# Patient Record
Sex: MALE | Race: WHITE | NOT HISPANIC OR LATINO | Employment: OTHER | URBAN - METROPOLITAN AREA
[De-identification: names, ages, dates, MRNs, and addresses within clinical notes are randomized per-mention and may not be internally consistent; named-entity substitution may affect disease eponyms.]

---

## 2021-08-23 ENCOUNTER — TELEPHONE (OUTPATIENT)
Dept: ORTHOPEDICS | Facility: CLINIC | Age: 66
End: 2021-08-23

## 2021-08-23 ENCOUNTER — OFFICE VISIT (OUTPATIENT)
Dept: ORTHOPEDICS | Facility: CLINIC | Age: 66
End: 2021-08-23
Payer: MEDICARE

## 2021-08-23 ENCOUNTER — HOSPITAL ENCOUNTER (OUTPATIENT)
Dept: RADIOLOGY | Facility: OTHER | Age: 66
Discharge: HOME | End: 2021-08-23
Attending: ORTHOPAEDIC SURGERY
Payer: MEDICARE

## 2021-08-23 VITALS — BODY MASS INDEX: 29.53 KG/M2 | HEIGHT: 72 IN | WEIGHT: 218 LBS

## 2021-08-23 DIAGNOSIS — M79.641 BILATERAL HAND PAIN: Primary | ICD-10-CM

## 2021-08-23 DIAGNOSIS — M79.642 BILATERAL HAND PAIN: Primary | ICD-10-CM

## 2021-08-23 DIAGNOSIS — M65.332 TRIGGER FINGER, LEFT MIDDLE FINGER: ICD-10-CM

## 2021-08-23 DIAGNOSIS — M65.342 TRIGGER FINGER, LEFT RING FINGER: ICD-10-CM

## 2021-08-23 DIAGNOSIS — M79.642 BILATERAL HAND PAIN: ICD-10-CM

## 2021-08-23 DIAGNOSIS — M79.641 BILATERAL HAND PAIN: ICD-10-CM

## 2021-08-23 PROCEDURE — 99203 PR OFFICE/OUTPT VISIT, NEW, LEVL III, 30-44 MIN: ICD-10-PCS | Mod: S$PBB,25,, | Performed by: ORTHOPAEDIC SURGERY

## 2021-08-23 PROCEDURE — 73130 X-RAY EXAM OF HAND: CPT | Mod: TC,50,FY

## 2021-08-23 PROCEDURE — 73130 XR HAND COMPLETE 3 VIEWS BILATERAL: ICD-10-PCS | Mod: 26,50,, | Performed by: RADIOLOGY

## 2021-08-23 PROCEDURE — 99999 PR PBB SHADOW E&M-NEW PATIENT-LVL II: CPT | Mod: PBBFAC,,, | Performed by: ORTHOPAEDIC SURGERY

## 2021-08-23 PROCEDURE — 20550 NJX 1 TENDON SHEATH/LIGAMENT: CPT | Mod: PBBFAC,LT | Performed by: ORTHOPAEDIC SURGERY

## 2021-08-23 PROCEDURE — 99203 OFFICE O/P NEW LOW 30 MIN: CPT | Mod: S$PBB,25,, | Performed by: ORTHOPAEDIC SURGERY

## 2021-08-23 PROCEDURE — 20550 TENDON SHEATH: ICD-10-PCS | Mod: S$PBB,LT,, | Performed by: ORTHOPAEDIC SURGERY

## 2021-08-23 PROCEDURE — 99202 OFFICE O/P NEW SF 15 MIN: CPT | Mod: PBBFAC,25 | Performed by: ORTHOPAEDIC SURGERY

## 2021-08-23 PROCEDURE — 73130 X-RAY EXAM OF HAND: CPT | Mod: 26,50,, | Performed by: RADIOLOGY

## 2021-08-23 PROCEDURE — 99999 PR PBB SHADOW E&M-NEW PATIENT-LVL II: ICD-10-PCS | Mod: PBBFAC,,, | Performed by: ORTHOPAEDIC SURGERY

## 2021-08-23 RX ORDER — ROSUVASTATIN CALCIUM 20 MG/1
20 TABLET, COATED ORAL DAILY
COMMUNITY

## 2021-08-23 RX ORDER — AMLODIPINE BESYLATE 5 MG/1
5 TABLET ORAL DAILY
COMMUNITY

## 2021-08-23 RX ORDER — DEXAMETHASONE SODIUM PHOSPHATE 4 MG/ML
4 INJECTION, SOLUTION INTRA-ARTICULAR; INTRALESIONAL; INTRAMUSCULAR; INTRAVENOUS; SOFT TISSUE
Status: DISCONTINUED | OUTPATIENT
Start: 2021-08-23 | End: 2021-08-23 | Stop reason: HOSPADM

## 2021-08-23 RX ORDER — NAPROXEN SODIUM 220 MG/1
81 TABLET, FILM COATED ORAL DAILY
COMMUNITY

## 2021-08-23 RX ORDER — POTASSIUM CHLORIDE 750 MG/1
10 CAPSULE, EXTENDED RELEASE ORAL ONCE
COMMUNITY

## 2021-08-23 RX ORDER — LEVOTHYROXINE SODIUM 50 UG/1
50 TABLET ORAL
COMMUNITY

## 2021-08-23 RX ORDER — TRIAMCINOLONE ACETONIDE 40 MG/ML
40 INJECTION, SUSPENSION INTRA-ARTICULAR; INTRAMUSCULAR
Status: DISCONTINUED | OUTPATIENT
Start: 2021-08-23 | End: 2021-08-23 | Stop reason: HOSPADM

## 2021-08-23 RX ORDER — CARVEDILOL 25 MG/1
25 TABLET ORAL 2 TIMES DAILY WITH MEALS
COMMUNITY

## 2021-08-23 RX ORDER — VALSARTAN 160 MG/1
160 TABLET ORAL DAILY
COMMUNITY

## 2021-08-23 RX ORDER — OLANZAPINE 2.5 MG/1
2.5 TABLET ORAL NIGHTLY
COMMUNITY

## 2021-08-23 RX ORDER — FINASTERIDE 5 MG/1
5 TABLET, FILM COATED ORAL DAILY
COMMUNITY

## 2021-08-23 RX ADMIN — DEXAMETHASONE SODIUM PHOSPHATE 4 MG: 4 INJECTION INTRA-ARTICULAR; INTRALESIONAL; INTRAMUSCULAR; INTRAVENOUS; SOFT TISSUE at 01:08

## 2021-08-23 RX ADMIN — TRIAMCINOLONE ACETONIDE 40 MG: 40 INJECTION, SUSPENSION INTRA-ARTICULAR; INTRAMUSCULAR at 01:08

## 2021-09-14 ENCOUNTER — OFFICE VISIT (OUTPATIENT)
Dept: PAIN MEDICINE | Facility: CLINIC | Age: 66
End: 2021-09-14
Payer: MEDICARE

## 2021-09-14 ENCOUNTER — LAB VISIT (OUTPATIENT)
Dept: LAB | Facility: HOSPITAL | Age: 66
End: 2021-09-14
Attending: PHYSICAL MEDICINE & REHABILITATION
Payer: MEDICARE

## 2021-09-14 VITALS — DIASTOLIC BLOOD PRESSURE: 88 MMHG | HEART RATE: 72 BPM | SYSTOLIC BLOOD PRESSURE: 131 MMHG

## 2021-09-14 DIAGNOSIS — M53.86 DECREASED RANGE OF MOTION OF LUMBAR SPINE: ICD-10-CM

## 2021-09-14 DIAGNOSIS — M96.1 FAILED BACK SURGICAL SYNDROME: ICD-10-CM

## 2021-09-14 DIAGNOSIS — M54.16 LUMBAR RADICULOPATHY: ICD-10-CM

## 2021-09-14 DIAGNOSIS — R29.898 WEAKNESS OF LEFT LEG: ICD-10-CM

## 2021-09-14 DIAGNOSIS — M54.16 LUMBAR RADICULOPATHY: Primary | ICD-10-CM

## 2021-09-14 PROCEDURE — 99999 PR PBB SHADOW E&M-EST. PATIENT-LVL IV: ICD-10-PCS | Mod: PBBFAC,,, | Performed by: PHYSICAL MEDICINE & REHABILITATION

## 2021-09-14 PROCEDURE — 99999 PR PBB SHADOW E&M-EST. PATIENT-LVL IV: CPT | Mod: PBBFAC,,, | Performed by: PHYSICAL MEDICINE & REHABILITATION

## 2021-09-14 PROCEDURE — 80307 DRUG TEST PRSMV CHEM ANLYZR: CPT | Performed by: PHYSICAL MEDICINE & REHABILITATION

## 2021-09-14 PROCEDURE — 99214 OFFICE O/P EST MOD 30 MIN: CPT | Mod: PBBFAC,PO | Performed by: PHYSICAL MEDICINE & REHABILITATION

## 2021-09-14 PROCEDURE — 99204 OFFICE O/P NEW MOD 45 MIN: CPT | Mod: S$PBB,,, | Performed by: PHYSICAL MEDICINE & REHABILITATION

## 2021-09-14 PROCEDURE — 99204 PR OFFICE/OUTPT VISIT, NEW, LEVL IV, 45-59 MIN: ICD-10-PCS | Mod: S$PBB,,, | Performed by: PHYSICAL MEDICINE & REHABILITATION

## 2021-09-14 RX ORDER — VILAZODONE HYDROCHLORIDE 20 MG/1
40 TABLET ORAL DAILY
COMMUNITY

## 2021-09-14 RX ORDER — HYDROCODONE BITARTRATE AND ACETAMINOPHEN 5; 325 MG/1; MG/1
1 TABLET ORAL EVERY 6 HOURS PRN
COMMUNITY
End: 2021-10-14 | Stop reason: SDUPTHER

## 2021-09-14 RX ORDER — MORPHINE SULFATE 15 MG/1
15 TABLET, FILM COATED, EXTENDED RELEASE ORAL 2 TIMES DAILY
COMMUNITY
End: 2021-10-14 | Stop reason: SDUPTHER

## 2021-09-14 RX ORDER — BACLOFEN 10 MG/1
10 TABLET ORAL 3 TIMES DAILY PRN
Qty: 90 TABLET | Refills: 2 | Status: SHIPPED | OUTPATIENT
Start: 2021-09-14 | End: 2021-11-11 | Stop reason: SDUPTHER

## 2021-09-20 LAB
6MAM UR QL: NOT DETECTED
7AMINOCLONAZEPAM UR QL: NOT DETECTED
A-OH ALPRAZ UR QL: NOT DETECTED
ALPHA-OH-MIDAZOLAM: NOT DETECTED
ALPRAZ UR QL: NOT DETECTED
AMPHET UR QL SCN: NOT DETECTED
ANNOTATION COMMENT IMP: NORMAL
ANNOTATION COMMENT IMP: NORMAL
BARBITURATES UR QL: NOT DETECTED
BUPRENORPHINE UR QL: NOT DETECTED
BZE UR QL: NOT DETECTED
CARBOXYTHC UR QL: NOT DETECTED
CARISOPRODOL UR QL: NOT DETECTED
CLONAZEPAM UR QL: NOT DETECTED
CODEINE UR QL: NOT DETECTED
CREAT UR-MCNC: 207.2 MG/DL (ref 20–400)
DIAZEPAM UR QL: NOT DETECTED
ETHYL GLUCURONIDE UR QL: NOT DETECTED
FENTANYL UR QL: NOT DETECTED
GABAPENTIN: NOT DETECTED
HYDROCODONE UR QL: PRESENT
HYDROMORPHONE UR QL: PRESENT
LORAZEPAM UR QL: NOT DETECTED
MDA UR QL: NOT DETECTED
MDEA UR QL: NOT DETECTED
MDMA UR QL: NOT DETECTED
ME-PHENIDATE UR QL: NOT DETECTED
METHADONE UR QL: NOT DETECTED
METHAMPHET UR QL: NOT DETECTED
MIDAZOLAM UR QL SCN: NOT DETECTED
MORPHINE UR QL: PRESENT
NALOXONE: NOT DETECTED
NORBUPRENORPHINE UR QL CFM: NOT DETECTED
NORDIAZEPAM UR QL: NOT DETECTED
NORFENTANYL UR QL: NOT DETECTED
NORHYDROCODONE UR QL CFM: PRESENT
NORMEPERIDINE UR QL CFM: NOT DETECTED
NOROXYCODONE UR QL CFM: NOT DETECTED
NOROXYMORPHONE UR QL SCN: NOT DETECTED
OXAZEPAM UR QL: NOT DETECTED
OXYCODONE UR QL: NOT DETECTED
OXYMORPHONE UR QL: NOT DETECTED
PATHOLOGY STUDY: NORMAL
PCP UR QL: NOT DETECTED
PHENTERMINE UR QL: NOT DETECTED
PREGABALIN: NOT DETECTED
SERVICE CMNT-IMP: NORMAL
TAPENTADOL UR QL SCN: NOT DETECTED
TAPENTADOL UR QL SCN: NOT DETECTED
TEMAZEPAM UR QL: NOT DETECTED
TRAMADOL UR QL: NOT DETECTED
ZOLPIDEM METABOLITE: NOT DETECTED
ZOLPIDEM UR QL: NOT DETECTED

## 2021-10-14 ENCOUNTER — OFFICE VISIT (OUTPATIENT)
Dept: PAIN MEDICINE | Facility: CLINIC | Age: 66
End: 2021-10-14
Payer: MEDICARE

## 2021-10-14 DIAGNOSIS — M54.16 LUMBAR RADICULOPATHY: ICD-10-CM

## 2021-10-14 DIAGNOSIS — M96.1 FAILED BACK SURGICAL SYNDROME: Primary | ICD-10-CM

## 2021-10-14 DIAGNOSIS — F11.90 CHRONIC, CONTINUOUS USE OF OPIOIDS: ICD-10-CM

## 2021-10-14 PROCEDURE — 99214 PR OFFICE/OUTPT VISIT, EST, LEVL IV, 30-39 MIN: ICD-10-PCS | Mod: S$PBB,,, | Performed by: PHYSICAL MEDICINE & REHABILITATION

## 2021-10-14 PROCEDURE — 99999 PR PBB SHADOW E&M-EST. PATIENT-LVL III: ICD-10-PCS | Mod: PBBFAC,,, | Performed by: PHYSICAL MEDICINE & REHABILITATION

## 2021-10-14 PROCEDURE — 99999 PR PBB SHADOW E&M-EST. PATIENT-LVL III: CPT | Mod: PBBFAC,,, | Performed by: PHYSICAL MEDICINE & REHABILITATION

## 2021-10-14 PROCEDURE — 99214 OFFICE O/P EST MOD 30 MIN: CPT | Mod: S$PBB,,, | Performed by: PHYSICAL MEDICINE & REHABILITATION

## 2021-10-14 PROCEDURE — 99213 OFFICE O/P EST LOW 20 MIN: CPT | Mod: PBBFAC,PO | Performed by: PHYSICAL MEDICINE & REHABILITATION

## 2021-10-14 RX ORDER — HYDROCODONE BITARTRATE AND ACETAMINOPHEN 5; 325 MG/1; MG/1
1 TABLET ORAL EVERY 6 HOURS PRN
Qty: 90 TABLET | Refills: 0 | Status: SHIPPED | OUTPATIENT
Start: 2021-10-14 | End: 2021-11-11 | Stop reason: SDUPTHER

## 2021-10-14 RX ORDER — MORPHINE SULFATE 15 MG/1
15 TABLET, FILM COATED, EXTENDED RELEASE ORAL DAILY
Qty: 30 TABLET | Refills: 0 | Status: SHIPPED | OUTPATIENT
Start: 2021-10-14 | End: 2021-11-11

## 2021-10-18 ENCOUNTER — CLINICAL SUPPORT (OUTPATIENT)
Dept: REHABILITATION | Facility: OTHER | Age: 66
End: 2021-10-18
Payer: MEDICARE

## 2021-10-18 DIAGNOSIS — M53.86 DECREASED RANGE OF MOTION OF LUMBAR SPINE: ICD-10-CM

## 2021-10-18 DIAGNOSIS — M54.16 LUMBAR RADICULOPATHY: ICD-10-CM

## 2021-10-18 DIAGNOSIS — R29.898 WEAKNESS OF LEFT LEG: ICD-10-CM

## 2021-10-18 PROCEDURE — 97162 PT EVAL MOD COMPLEX 30 MIN: CPT | Mod: PN | Performed by: INTERNAL MEDICINE

## 2021-10-18 PROCEDURE — 97110 THERAPEUTIC EXERCISES: CPT | Mod: PN | Performed by: INTERNAL MEDICINE

## 2021-10-19 PROBLEM — M54.16 LUMBAR RADICULOPATHY: Status: ACTIVE | Noted: 2021-10-19

## 2021-10-19 PROBLEM — R29.898 WEAKNESS OF LEFT LEG: Status: ACTIVE | Noted: 2021-10-19

## 2021-10-19 PROBLEM — M53.86 DECREASED RANGE OF MOTION OF LUMBAR SPINE: Status: ACTIVE | Noted: 2021-10-19

## 2021-10-25 ENCOUNTER — CLINICAL SUPPORT (OUTPATIENT)
Dept: REHABILITATION | Facility: OTHER | Age: 66
End: 2021-10-25
Payer: MEDICARE

## 2021-10-25 DIAGNOSIS — M54.50 CHRONIC BILATERAL LOW BACK PAIN WITHOUT SCIATICA: ICD-10-CM

## 2021-10-25 DIAGNOSIS — R29.898 LEFT LEG WEAKNESS: ICD-10-CM

## 2021-10-25 DIAGNOSIS — G89.29 CHRONIC BILATERAL LOW BACK PAIN WITHOUT SCIATICA: ICD-10-CM

## 2021-10-25 PROCEDURE — 97110 THERAPEUTIC EXERCISES: CPT | Mod: PN | Performed by: PHYSICAL THERAPIST

## 2021-10-27 ENCOUNTER — CLINICAL SUPPORT (OUTPATIENT)
Dept: REHABILITATION | Facility: OTHER | Age: 66
End: 2021-10-27
Payer: MEDICARE

## 2021-10-27 DIAGNOSIS — G89.29 CHRONIC BILATERAL LOW BACK PAIN WITHOUT SCIATICA: ICD-10-CM

## 2021-10-27 DIAGNOSIS — M54.50 CHRONIC BILATERAL LOW BACK PAIN WITHOUT SCIATICA: ICD-10-CM

## 2021-10-27 DIAGNOSIS — R29.898 LEFT LEG WEAKNESS: ICD-10-CM

## 2021-10-27 PROCEDURE — 97110 THERAPEUTIC EXERCISES: CPT | Mod: PN | Performed by: INTERNAL MEDICINE

## 2021-11-04 ENCOUNTER — CLINICAL SUPPORT (OUTPATIENT)
Dept: REHABILITATION | Facility: OTHER | Age: 66
End: 2021-11-04
Payer: MEDICARE

## 2021-11-04 DIAGNOSIS — G89.29 CHRONIC BILATERAL LOW BACK PAIN WITHOUT SCIATICA: Primary | ICD-10-CM

## 2021-11-04 DIAGNOSIS — M54.50 CHRONIC BILATERAL LOW BACK PAIN WITHOUT SCIATICA: Primary | ICD-10-CM

## 2021-11-04 DIAGNOSIS — R29.898 LEFT LEG WEAKNESS: ICD-10-CM

## 2021-11-04 PROCEDURE — 97110 THERAPEUTIC EXERCISES: CPT | Mod: PN | Performed by: PHYSICAL THERAPIST

## 2021-11-11 ENCOUNTER — PATIENT MESSAGE (OUTPATIENT)
Dept: REHABILITATION | Facility: OTHER | Age: 66
End: 2021-11-11

## 2021-11-11 ENCOUNTER — OFFICE VISIT (OUTPATIENT)
Dept: PAIN MEDICINE | Facility: CLINIC | Age: 66
End: 2021-11-11
Payer: MEDICARE

## 2021-11-11 VITALS — HEART RATE: 80 BPM | SYSTOLIC BLOOD PRESSURE: 125 MMHG | DIASTOLIC BLOOD PRESSURE: 86 MMHG

## 2021-11-11 DIAGNOSIS — R29.898 WEAKNESS OF LEFT LEG: ICD-10-CM

## 2021-11-11 DIAGNOSIS — F11.90 CHRONIC, CONTINUOUS USE OF OPIOIDS: ICD-10-CM

## 2021-11-11 DIAGNOSIS — M96.1 FAILED BACK SURGICAL SYNDROME: ICD-10-CM

## 2021-11-11 DIAGNOSIS — M53.86 DECREASED RANGE OF MOTION OF LUMBAR SPINE: ICD-10-CM

## 2021-11-11 DIAGNOSIS — M54.16 LUMBAR RADICULOPATHY: ICD-10-CM

## 2021-11-11 PROCEDURE — 99213 OFFICE O/P EST LOW 20 MIN: CPT | Mod: PBBFAC,PO | Performed by: PHYSICAL MEDICINE & REHABILITATION

## 2021-11-11 PROCEDURE — 99214 PR OFFICE/OUTPT VISIT, EST, LEVL IV, 30-39 MIN: ICD-10-PCS | Mod: S$PBB,,, | Performed by: PHYSICAL MEDICINE & REHABILITATION

## 2021-11-11 PROCEDURE — 99999 PR PBB SHADOW E&M-EST. PATIENT-LVL III: CPT | Mod: PBBFAC,,, | Performed by: PHYSICAL MEDICINE & REHABILITATION

## 2021-11-11 PROCEDURE — 99999 PR PBB SHADOW E&M-EST. PATIENT-LVL III: ICD-10-PCS | Mod: PBBFAC,,, | Performed by: PHYSICAL MEDICINE & REHABILITATION

## 2021-11-11 PROCEDURE — 99214 OFFICE O/P EST MOD 30 MIN: CPT | Mod: S$PBB,,, | Performed by: PHYSICAL MEDICINE & REHABILITATION

## 2021-11-11 RX ORDER — BACLOFEN 10 MG/1
20 TABLET ORAL 3 TIMES DAILY PRN
Qty: 90 TABLET | Refills: 2 | Status: SHIPPED | OUTPATIENT
Start: 2021-11-11 | End: 2021-11-12

## 2021-11-11 RX ORDER — HYDROCODONE BITARTRATE AND ACETAMINOPHEN 5; 325 MG/1; MG/1
1 TABLET ORAL EVERY 6 HOURS PRN
Qty: 120 TABLET | Refills: 0 | Status: SHIPPED | OUTPATIENT
Start: 2021-11-13 | End: 2021-12-07

## 2021-11-12 ENCOUNTER — TELEPHONE (OUTPATIENT)
Dept: PAIN MEDICINE | Facility: CLINIC | Age: 66
End: 2021-11-12
Payer: MEDICARE

## 2021-11-12 DIAGNOSIS — M54.16 LUMBAR RADICULOPATHY: ICD-10-CM

## 2021-11-12 DIAGNOSIS — R29.898 WEAKNESS OF LEFT LEG: ICD-10-CM

## 2021-11-12 DIAGNOSIS — M96.1 FAILED BACK SURGICAL SYNDROME: ICD-10-CM

## 2021-11-12 DIAGNOSIS — M53.86 DECREASED RANGE OF MOTION OF LUMBAR SPINE: ICD-10-CM

## 2021-11-12 RX ORDER — BACLOFEN 10 MG/1
20 TABLET ORAL 3 TIMES DAILY PRN
Qty: 180 TABLET | Refills: 2 | Status: SHIPPED | OUTPATIENT
Start: 2021-11-12 | End: 2022-01-18

## 2021-11-29 ENCOUNTER — DOCUMENTATION ONLY (OUTPATIENT)
Dept: REHABILITATION | Facility: OTHER | Age: 66
End: 2021-11-29
Payer: MEDICARE

## 2021-12-07 ENCOUNTER — OFFICE VISIT (OUTPATIENT)
Dept: PAIN MEDICINE | Facility: CLINIC | Age: 66
End: 2021-12-07
Payer: MEDICARE

## 2021-12-07 DIAGNOSIS — F11.90 CHRONIC, CONTINUOUS USE OF OPIOIDS: ICD-10-CM

## 2021-12-07 DIAGNOSIS — M96.1 FAILED BACK SURGICAL SYNDROME: ICD-10-CM

## 2021-12-07 DIAGNOSIS — M54.16 LUMBAR RADICULOPATHY: ICD-10-CM

## 2021-12-07 PROCEDURE — 99214 OFFICE O/P EST MOD 30 MIN: CPT | Mod: 95,,, | Performed by: PHYSICAL MEDICINE & REHABILITATION

## 2021-12-07 PROCEDURE — 99214 PR OFFICE/OUTPT VISIT, EST, LEVL IV, 30-39 MIN: ICD-10-PCS | Mod: 95,,, | Performed by: PHYSICAL MEDICINE & REHABILITATION

## 2021-12-07 RX ORDER — HYDROCODONE BITARTRATE AND ACETAMINOPHEN 5; 325 MG/1; MG/1
1 TABLET ORAL EVERY 8 HOURS PRN
Qty: 90 TABLET | Refills: 0 | Status: SHIPPED | OUTPATIENT
Start: 2021-12-13 | End: 2022-01-11

## 2021-12-07 RX ORDER — TIZANIDINE 4 MG/1
4 TABLET ORAL
Qty: 90 TABLET | Refills: 5 | Status: SHIPPED | OUTPATIENT
Start: 2021-12-07 | End: 2021-12-17

## 2022-01-11 ENCOUNTER — OFFICE VISIT (OUTPATIENT)
Dept: PAIN MEDICINE | Facility: CLINIC | Age: 67
End: 2022-01-11
Payer: MEDICARE

## 2022-01-11 DIAGNOSIS — M54.16 LUMBAR RADICULOPATHY: ICD-10-CM

## 2022-01-11 DIAGNOSIS — F11.90 CHRONIC, CONTINUOUS USE OF OPIOIDS: ICD-10-CM

## 2022-01-11 DIAGNOSIS — G47.01 INSOMNIA DUE TO MEDICAL CONDITION: ICD-10-CM

## 2022-01-11 DIAGNOSIS — M96.1 FAILED BACK SURGICAL SYNDROME: Primary | ICD-10-CM

## 2022-01-11 DIAGNOSIS — F11.93 WITHDRAWAL FROM OPIOIDS: ICD-10-CM

## 2022-01-11 PROCEDURE — 99214 PR OFFICE/OUTPT VISIT, EST, LEVL IV, 30-39 MIN: ICD-10-PCS | Mod: 95,,, | Performed by: PHYSICAL MEDICINE & REHABILITATION

## 2022-01-11 PROCEDURE — 99214 OFFICE O/P EST MOD 30 MIN: CPT | Mod: 95,,, | Performed by: PHYSICAL MEDICINE & REHABILITATION

## 2022-01-11 RX ORDER — HYDROCODONE BITARTRATE AND ACETAMINOPHEN 5; 325 MG/1; MG/1
1 TABLET ORAL EVERY 12 HOURS PRN
Qty: 60 TABLET | Refills: 0 | Status: SHIPPED | OUTPATIENT
Start: 2022-01-11 | End: 2022-02-17 | Stop reason: SDUPTHER

## 2022-01-11 RX ORDER — HYDROXYZINE HYDROCHLORIDE 25 MG/1
25-50 TABLET, FILM COATED ORAL NIGHTLY PRN
Qty: 60 TABLET | Refills: 3 | Status: SHIPPED | OUTPATIENT
Start: 2022-01-11 | End: 2022-02-17

## 2022-01-11 NOTE — PROGRESS NOTES
"Pain Medicine  Telemedicine Virtual Visit    The patient location is: [place]  The chief complaint leading to consultation is: Opioid taper  Visit type: Virtual visit with synchronous audio and video  Total time spent with patient: 15 mins  Each patient to whom he or she provides medical services by telemedicine is:  (1) informed of the relationship between the physician and patient and the respective role of any other health care provider with respect to management of the patient; and (2) notified that he or she may decline to receive medical services by telemedicine and may withdraw from such care at any time.      Chief Complaint:   Chief Complaint   Patient presents with    Low-back Pain       History of Present Illness: Horace Leonard is a 66 y.o. male here for low back pain.    Onset: Many years. He fell off a stack of [employer] when he was 13 years old. He had 2 lumbar surgeries, one in the 1992 and the 2nd in 2013.   Location: low back  Radiation: left posterior thigh  Timing: constant  Quality: Aching  Exacerbating Factors: lifting  Alleviating Factors: medications  Associated Symptoms: He feels "weak" in his legs because he is out of shape. He feels abnormal sensation in the top of his left foot. He denies night fever/night sweats, urinary incontinence, bowel incontinence and significant weight loss    Severity: Currently: 3/10   Typical Range: 3-6/10     Exacerbation: 6/10     Today, pain is not his main complaint. His main issue is that he wants to get off of chronic opioids, but has has trouble in the past trying to do this. His previous physician tried to switch him to all short acting medications, but he felt worse on this initially.     Patient had records from previous Pain Medicine provider in [place], Dr. Eliel Nicole MD, who attempted to switch him to Methadone, but he was not able to fill this medication. Per record review, he appears stable on his current pain medications with no " documentation of aberrant behavior. Prior UDS consistent with prescribed medications.       Interval History (01/11/2022):  Horace Leonard returns today for follow up.  At the last clinic visit, decreased Norco 5/325 mg from q 6 hr to q 8 hrs and cont baclofen 20 mg TID prn and added tizanidine 4 mg q 8 hrs prn as well to help with opioid taper.     Currently, he is taking the Norco 5/325 mg twice a day, which he takes as half a tablet 4 times per day and started this about a week ago. He does not feel like he is doing well with this. He is having jitteriness and insomnia during the night. He is taking the baclofen as 1 tablet usually and takes it about 4 times a day. He is taking the tizanidine mostly at night. He does feel weaker in his muscles when he takes the baclofen, but the tizanidine makes him feels sleepy. He denies diarrhea. He does feel like he is having more pain. He will get a backache when he lays down at night.     He is hoping he could have something to sleep longer during the night.            Previous Interventions:  -     Previous Therapies:  PT/OT: yes but not in a while  Relevant Surgery: yes    - 1992: Lumbar laminectomy    - 1994: Cervical fusion   - 2013: Lumbar fusion: ALIF L3-4, L4-5, L5-S1 in [place]  Previous Medications:   - NSAIDS: tylenol  - Muscle Relaxants:  Tizanidine. Baclofen.   - TCAs:   - SNRIs:   - Topicals:   - Anticonvulsants:    - Opioids: MS contin. Hydrocodone.     Current Pain Medications:  1. Norco 5/325mg q 8 hrs PRN    2. Baclofen 20 mg TID prn  3. Tizanidine 4 mg q 8 hrs prn    Blood Thinners: ASA 81 mg    Full Medication List:    Current Outpatient Medications:     amLODIPine (NORVASC) 5 MG tablet, Take 5 mg by mouth once daily., Disp: , Rfl:     aspirin 81 MG Chew, Take 81 mg by mouth once daily., Disp: , Rfl:     baclofen (LIORESAL) 10 MG tablet, Take 2 tablets (20 mg total) by mouth 3 (three) times daily as needed (pain)., Disp: 180 tablet, Rfl: 2     carvediloL (COREG) 25 MG tablet, Take 25 mg by mouth 2 (two) times daily with meals., Disp: , Rfl:     finasteride (PROSCAR) 5 mg tablet, Take 5 mg by mouth once daily., Disp: , Rfl:     HYDROcodone-acetaminophen (NORCO) 5-325 mg per tablet, Take 1 tablet by mouth every 12 (twelve) hours as needed for Pain., Disp: 60 tablet, Rfl: 0    hydrOXYzine HCL (ATARAX) 25 MG tablet, Take 1-2 tablets (25-50 mg total) by mouth nightly as needed for Anxiety (Insomnia)., Disp: 60 tablet, Rfl: 3    levothyroxine (SYNTHROID) 50 MCG tablet, Take 50 mcg by mouth before breakfast., Disp: , Rfl:     OLANZapine (ZYPREXA) 2.5 MG tablet, Take 2.5 mg by mouth every evening., Disp: , Rfl:     potassium chloride (MICRO-K) 10 MEQ CpSR, Take 10 mEq by mouth once., Disp: , Rfl:     rosuvastatin (CRESTOR) 20 MG tablet, Take 20 mg by mouth once daily., Disp: , Rfl:     valsartan (DIOVAN) 160 MG tablet, Take 160 mg by mouth once daily., Disp: , Rfl:     vilazodone (VIIBRYD) 20 mg Tab, Take by mouth once daily., Disp: , Rfl:      Review of Systems:  Review of Systems   Neurological: Positive for tingling and weakness.   Psychiatric/Behavioral: Positive for depression.       Allergies:  Ace inhibitors     Medical History:   has a past medical history of CAD (coronary artery disease), Depression, and HTN (hypertension).    Surgical History:   has a past surgical history that includes Back surgery and Neck surgery.    Family History:  family history is not on file.    Social History:   reports that he has never smoked. He has never used smokeless tobacco.    Physical Exam:  There were no vitals taken for this visit.  GEN: No acute distress. Calm, comfortable  HENT: Normocephalic, atraumatic, moist mucous membranes  EYE: Anicteric sclera, non-injected.   CV: Non-diaphoretic.   RESP: Breathing comfortably. Chest expansion symmetric.  EXT: No clubbing, cyanosis.   SKIN: No visible rashes or lesions of exposed skin.   PSYCH: Pleasant mood and  appropriate affect. Recent and remote memory intact.         Imaging:  - MRI Lumbar spine 1/23/13:  Signal and position of the conus.  The visualized lower thoracic region is unremarkable there is some straightening of the typical lumbar lordosis with minimal degenerative retrolisthesis at L4-5 and L5-S1.  Endplate degenerative changes are noted at the L3-4 through L5-S1 levels but there is no suspicious marrow signal abnormality to suggest an acute fracture or bony destructive process.  The paraspinous musculature and soft tissues are unremarkable.  There is ligamentum flavum and facet hypertrophic changes in the lower lumbar spine, most advanced at L4-5 and L5-S1.  No active synovitis identified on STIR imaging.  No large facet joint effusion.  L1-2:  Mild disc bulging.  L2-3: Significant loss of disc space height with disc dessication. Only mild disc bulging and no advanced stenosis.  L3-4: Significant loss of disc space height. Moderate symmetric disc bulging. Moderate narrowing of the central canal and lateral recesses. Moderate right foraminal stenosis and mild left foraminal stenosis.  L4-5: disc desiccation with significant loss of disc space height. Symmetric disc bulging. Mild narrowing of the central canal and lateral recesses. Moderate right and mild left foraminal stenosis.   L5-S1    Labs:  BMP  No results found for: NA, K, CL, CO2, BUN, CREATININE, CALCIUM, ANIONGAP, ESTGFRAFRICA, EGFRNONAA  No results found for: ALT, AST, GGT, ALKPHOS, BILITOT  No results found for: PLT    Assessment:  Horace Leonard is a 66 y.o. male with the following diagnoses based on history, exam, and imaging:    Problem List Items Addressed This Visit        Neuro    Lumbar radiculopathy    Relevant Medications    HYDROcodone-acetaminophen (NORCO) 5-325 mg per tablet      Other Visit Diagnoses     Failed back surgical syndrome    -  Primary    Relevant Medications    hydrOXYzine HCL (ATARAX) 25 MG tablet     HYDROcodone-acetaminophen (NORCO) 5-325 mg per tablet    Chronic, continuous use of opioids        Relevant Medications    hydrOXYzine HCL (ATARAX) 25 MG tablet    HYDROcodone-acetaminophen (NORCO) 5-325 mg per tablet    Insomnia due to medical condition        Relevant Medications    hydrOXYzine HCL (ATARAX) 25 MG tablet    Withdrawal from opioids        Relevant Medications    hydrOXYzine HCL (ATARAX) 25 MG tablet          This is a pleasant 66 y.o. gentleman presenting with:     - Chronic low back pain: Prior lumbar surgery with ALIF L3-S1. Back pain not his predominant complaint. He does still having findings of a left S1 radiculopathy on exam.   - Chronic opioid use: His goal is to wean off of these medications, but has had trouble doing this in the past.   - Comorbidities: Depression. Insomnia.     Treatment Plan:   - PT/OT/HEP: Discussed benefits of exercise for pain and withdrawal symptoms. He does find it helps, but cannot do this at night.   - Procedures: None at this time. Consider caudal ROSMEYR in future, but main goal is getting off opioids at this point and pain is less of an issue.   - Medications:    - Change Norco 5/325 mg to q 12 hrs prn. #60. Will continue taper slowly.    -  reviewed.    - Cont baclofen 20 mg TID prn, but can still take 10 mg if not needing the 20 mg     - Pain contract signed 09/14/2021    - Cont Tizanidine 4 mg q 8 hrs prn. Start at night. Can increase to two-three tablets if needed.    - Start hydroxyzine 25-50 mg for sleep at night.   - Imaging: Reviewed. Consider updating lumbar MRI prior to any procedures if needed.   - Labs: Reviewed    Follow Up: RTC in 1 month    Kaycee Cordova M.D.  Interventional Pain Medicine / Physical Medicine & Rehabilitation    Disclaimer: This note was partly generated using dictation software which may occasionally result in transcription errors.

## 2022-02-17 ENCOUNTER — OFFICE VISIT (OUTPATIENT)
Dept: PAIN MEDICINE | Facility: CLINIC | Age: 67
End: 2022-02-17
Payer: MEDICARE

## 2022-02-17 VITALS
DIASTOLIC BLOOD PRESSURE: 89 MMHG | HEART RATE: 89 BPM | WEIGHT: 218.06 LBS | BODY MASS INDEX: 29.57 KG/M2 | SYSTOLIC BLOOD PRESSURE: 129 MMHG

## 2022-02-17 DIAGNOSIS — M54.16 LUMBAR RADICULOPATHY: ICD-10-CM

## 2022-02-17 DIAGNOSIS — F11.90 CHRONIC, CONTINUOUS USE OF OPIOIDS: ICD-10-CM

## 2022-02-17 DIAGNOSIS — M96.1 FAILED BACK SURGICAL SYNDROME: ICD-10-CM

## 2022-02-17 DIAGNOSIS — G47.01 INSOMNIA DUE TO MEDICAL CONDITION: Primary | ICD-10-CM

## 2022-02-17 PROCEDURE — 99214 OFFICE O/P EST MOD 30 MIN: CPT | Mod: S$PBB,,, | Performed by: PHYSICAL MEDICINE & REHABILITATION

## 2022-02-17 PROCEDURE — 99214 PR OFFICE/OUTPT VISIT, EST, LEVL IV, 30-39 MIN: ICD-10-PCS | Mod: S$PBB,,, | Performed by: PHYSICAL MEDICINE & REHABILITATION

## 2022-02-17 PROCEDURE — 99213 OFFICE O/P EST LOW 20 MIN: CPT | Mod: PBBFAC,PO | Performed by: PHYSICAL MEDICINE & REHABILITATION

## 2022-02-17 PROCEDURE — 99999 PR PBB SHADOW E&M-EST. PATIENT-LVL III: CPT | Mod: PBBFAC,,, | Performed by: PHYSICAL MEDICINE & REHABILITATION

## 2022-02-17 PROCEDURE — 99999 PR PBB SHADOW E&M-EST. PATIENT-LVL III: ICD-10-PCS | Mod: PBBFAC,,, | Performed by: PHYSICAL MEDICINE & REHABILITATION

## 2022-02-17 RX ORDER — HYDROCODONE BITARTRATE AND ACETAMINOPHEN 5; 325 MG/1; MG/1
1 TABLET ORAL EVERY 12 HOURS PRN
Qty: 60 TABLET | Refills: 0 | Status: SHIPPED | OUTPATIENT
Start: 2022-02-17 | End: 2022-03-29 | Stop reason: SDUPTHER

## 2022-02-17 RX ORDER — TIZANIDINE 4 MG/1
4-8 TABLET ORAL NIGHTLY PRN
Qty: 60 TABLET | Refills: 5 | Status: SHIPPED | OUTPATIENT
Start: 2022-02-17 | End: 2022-06-14

## 2022-02-17 NOTE — PROGRESS NOTES
"Pain Medicine        Chief Complaint:   Chief Complaint   Patient presents with    Medication Refill       History of Present Illness: Horace Leonard is a 66 y.o. male here for low back pain.    Onset: Many years. He fell off a stack of [employer]wood when he was 13 years old. He had 2 lumbar surgeries, one in the 1992 and the 2nd in 2013.   Location: low back  Radiation: left posterior thigh  Timing: constant  Quality: Aching  Exacerbating Factors: lifting  Alleviating Factors: medications  Associated Symptoms: He feels "weak" in his legs because he is out of shape. He feels abnormal sensation in the top of his left foot. He denies night fever/night sweats, urinary incontinence, bowel incontinence and significant weight loss    Severity: Currently: 3/10   Typical Range: 3-6/10     Exacerbation: 6/10     Today, pain is not his main complaint. His main issue is that he wants to get off of chronic opioids, but has has trouble in the past trying to do this. His previous physician tried to switch him to all short acting medications, but he felt worse on this initially.     Patient had records from previous Pain Medicine provider in [place], Dr. Eliel Nicole MD, who attempted to switch him to Methadone, but he was not able to fill this medication. Per record review, he appears stable on his current pain medications with no documentation of aberrant behavior. Prior UDS consistent with prescribed medications.       Interval History (01/11/2022):  Horace Leonard returns today for follow up.  At the last clinic visit, decreased Norco 5/325 mg from q 6 hr to q 8 hrs and cont baclofen 20 mg TID prn and added tizanidine 4 mg q 8 hrs prn as well to help with opioid taper.     Currently, he is taking the Norco 5/325 mg twice a day, which he takes as half a tablet 4 times per day and started this about a week ago. He does not feel like he is doing well with this. He is having jitteriness and insomnia during the night. He is taking the " baclofen as 1 tablet usually and takes it about 4 times a day. He is taking the tizanidine mostly at night. He does feel weaker in his muscles when he takes the baclofen, but the tizanidine makes him feels sleepy. He denies diarrhea. He does feel like he is having more pain. He will get a backache when he lays down at night.     He is hoping he could have something to sleep longer during the night.     Interval History (02/17/2022):  Horace Leonard returns today for follow up.  At the last clinic visit, changed norco to 5/325 mg BID prn, cont baclofen 20 mg TID, and cont tizanidine 4 mg TID prn, and started hydroxyzine for sleep.     He feels like he is currently having worsening depression.  He is currently taking Norco 5-325 milligrams, and he takes a half a tablet 3-4 times daily.  He is still having significant issues trying to fall asleep despite hydroxyzine.  He continues to take baclofen during the day and occasionally tizanidine 4 milligrams at night.  He denies any other side effects from this medication.  He is planning on seeing psychiatry to try to address his worsening depression.    Currently, the left sided low back pain is stable.      Patient is currently taking Norco which provides 50% relief.      Current Pain Scales:  Current: 2/10              Typical Range: 2-3/10      Pain Disability Index  Family/Home Responsibilities:: 6  Recreation:: 6  Social Activity:: 2  Occupation:: 0  Sexual Behavior:: 7  Self Care:: 0  Life-Support Activities:: 0  Pain Disability Index (PDI): 21       Previous Interventions:  -     Previous Therapies:  PT/OT: yes but not in a while  Relevant Surgery: yes    - 1992: Lumbar laminectomy    - 1994: Cervical fusion   - 2013: Lumbar fusion: ALIF L3-4, L4-5, L5-S1 in [place]  Previous Medications:   - NSAIDS: tylenol  - Muscle Relaxants:  Tizanidine. Baclofen.   - TCAs:   - SNRIs:   - Topicals:   - Anticonvulsants:    - Opioids: MS contin. Hydrocodone.     Current Pain  Medications:  1. Norco 5/325mg q 8 hrs PRN    2. Baclofen 20 mg TID prn  3. Tizanidine 4 mg q 8 hrs prn    Blood Thinners: ASA 81 mg    Full Medication List:    Current Outpatient Medications:     amLODIPine (NORVASC) 5 MG tablet, Take 5 mg by mouth once daily., Disp: , Rfl:     aspirin 81 MG Chew, Take 81 mg by mouth once daily., Disp: , Rfl:     baclofen (LIORESAL) 10 MG tablet, TAKE 2 TABLETS(20 MG) BY MOUTH THREE TIMES DAILY AS NEEDED FOR PAIN, Disp: 180 tablet, Rfl: 2    carvediloL (COREG) 25 MG tablet, Take 25 mg by mouth 2 (two) times daily with meals., Disp: , Rfl:     finasteride (PROSCAR) 5 mg tablet, Take 5 mg by mouth once daily., Disp: , Rfl:     levothyroxine (SYNTHROID) 50 MCG tablet, Take 50 mcg by mouth before breakfast., Disp: , Rfl:     OLANZapine (ZYPREXA) 2.5 MG tablet, Take 2.5 mg by mouth every evening., Disp: , Rfl:     potassium chloride (MICRO-K) 10 MEQ CpSR, Take 10 mEq by mouth once., Disp: , Rfl:     rosuvastatin (CRESTOR) 20 MG tablet, Take 20 mg by mouth once daily., Disp: , Rfl:     valsartan (DIOVAN) 160 MG tablet, Take 160 mg by mouth once daily., Disp: , Rfl:     vilazodone (VIIBRYD) 20 mg Tab, Take by mouth once daily., Disp: , Rfl:     HYDROcodone-acetaminophen (NORCO) 5-325 mg per tablet, Take 1 tablet by mouth every 12 (twelve) hours as needed for Pain., Disp: 60 tablet, Rfl: 0    tiZANidine (ZANAFLEX) 4 MG tablet, Take 1-2 tablets (4-8 mg total) by mouth nightly as needed (pain, muscle spasm, insomnia)., Disp: 60 tablet, Rfl: 5     Review of Systems:  Review of Systems   Neurological: Positive for tingling and weakness.   Psychiatric/Behavioral: Positive for depression.       Allergies:  Ace inhibitors     Medical History:   has a past medical history of CAD (coronary artery disease), Depression, and HTN (hypertension).    Surgical History:   has a past surgical history that includes Back surgery and Neck surgery.    Family History:  family history is not on  file.    Social History:   reports that he has never smoked. He has never used smokeless tobacco.    Physical Exam:  /89   Pulse 89   Wt 98.9 kg (218 lb 0.6 oz)   BMI 29.57 kg/m²   GEN: No acute distress. Calm, comfortable  HENT: Normocephalic, atraumatic, moist mucous membranes  EYE: Anicteric sclera, non-injected.   CV: Non-diaphoretic.   RESP: Breathing comfortably. Chest expansion symmetric.  EXT: No clubbing, cyanosis.   SKIN: No visible rashes or lesions of exposed skin.   PSYCH: Pleasant mood and appropriate affect. Recent and remote memory intact.         Imaging:  - MRI Lumbar spine 1/23/13:  Signal and position of the conus.  The visualized lower thoracic region is unremarkable there is some straightening of the typical lumbar lordosis with minimal degenerative retrolisthesis at L4-5 and L5-S1.  Endplate degenerative changes are noted at the L3-4 through L5-S1 levels but there is no suspicious marrow signal abnormality to suggest an acute fracture or bony destructive process.  The paraspinous musculature and soft tissues are unremarkable.  There is ligamentum flavum and facet hypertrophic changes in the lower lumbar spine, most advanced at L4-5 and L5-S1.  No active synovitis identified on STIR imaging.  No large facet joint effusion.  L1-2:  Mild disc bulging.  L2-3: Significant loss of disc space height with disc dessication. Only mild disc bulging and no advanced stenosis.  L3-4: Significant loss of disc space height. Moderate symmetric disc bulging. Moderate narrowing of the central canal and lateral recesses. Moderate right foraminal stenosis and mild left foraminal stenosis.  L4-5: disc desiccation with significant loss of disc space height. Symmetric disc bulging. Mild narrowing of the central canal and lateral recesses. Moderate right and mild left foraminal stenosis.   L5-S1    Labs:  BMP  No results found for: NA, K, CL, CO2, BUN, CREATININE, CALCIUM, ANIONGAP, ESTGFRAFRICA,  EGFRNONAA  No results found for: ALT, AST, GGT, ALKPHOS, BILITOT  No results found for: PLT    Assessment:  Horace Leonard is a 66 y.o. male with the following diagnoses based on history, exam, and imaging:    Problem List Items Addressed This Visit        Neuro    Lumbar radiculopathy    Relevant Medications    HYDROcodone-acetaminophen (NORCO) 5-325 mg per tablet    tiZANidine (ZANAFLEX) 4 MG tablet      Other Visit Diagnoses     Insomnia due to medical condition    -  Primary    Failed back surgical syndrome        Relevant Medications    HYDROcodone-acetaminophen (NORCO) 5-325 mg per tablet    tiZANidine (ZANAFLEX) 4 MG tablet    Chronic, continuous use of opioids        Relevant Medications    HYDROcodone-acetaminophen (NORCO) 5-325 mg per tablet    tiZANidine (ZANAFLEX) 4 MG tablet          This is a pleasant 66 y.o. gentleman presenting with:     - Chronic low back pain: Prior lumbar surgery with ALIF L3-S1. Back pain not his predominant complaint. He does still having findings of a left S1 radiculopathy on exam.   - Chronic opioid use: His goal is to wean off of these medications, but has had trouble doing this in the past.   - Comorbidities: Depression. Insomnia.     Treatment Plan:   - PT/OT/HEP: Discussed benefits of exercise for pain and withdrawal symptoms. He does find it helps, but cannot do this at night.   - Procedures: None at this time. Consider caudal ROSMERY in future, but main goal is getting off opioids at this point and pain is less of an issue.   - Medications:    - Cont Norco 5/325 mg q 12 hrs prn. #60. Will continue taper slowly when depression is better controlled and when he is ready.    -  reviewed.    - Cont baclofen 20 mg TID prn, but can still take 10 mg if not needing the 20 mg     - Pain contract signed 09/14/2021    - Trial change in Tizanidine to 4-8 mg qHS prn. He can take 3-4 tablets as well if still having issues falling asleep.  - DC hydroxyzine   - Imaging: Reviewed. Consider  updating lumbar MRI prior to any procedures if needed.   - Labs: Reviewed    Follow Up: RTC in 1 month for VV    Kaycee Cordova M.D.  Interventional Pain Medicine / Physical Medicine & Rehabilitation    Disclaimer: This note was partly generated using dictation software which may occasionally result in transcription errors.

## 2022-03-02 ENCOUNTER — TELEPHONE (OUTPATIENT)
Dept: PAIN MEDICINE | Facility: CLINIC | Age: 67
End: 2022-03-02
Payer: MEDICARE

## 2022-03-02 ENCOUNTER — PATIENT MESSAGE (OUTPATIENT)
Dept: PAIN MEDICINE | Facility: CLINIC | Age: 67
End: 2022-03-02
Payer: MEDICARE

## 2022-03-02 NOTE — TELEPHONE ENCOUNTER
----- Message from Ene Schilling sent at 3/2/2022 11:41 AM CST -----  Regarding: Medication Inquiry  Type:  Needs Medical Advice    Who Called: pt    Would the patient rather a call back or a response via [email or web address]? Call    Best Call Back Number: [phone]    Additional Information: pt has upcoming jury duty

## 2022-03-02 NOTE — TELEPHONE ENCOUNTER
"Called patient back-   He asked to speak to Dr Cordova about decreasing his medication- opiods.  Per patient, he tried to cut down on his own and was feeling really bad.     I tried offering an appt for him to come in but he denied as a plan has already been placed for him.   " I just need to talk to Kaycee, please have him call me."           "

## 2022-03-03 ENCOUNTER — PATIENT MESSAGE (OUTPATIENT)
Dept: PAIN MEDICINE | Facility: CLINIC | Age: 67
End: 2022-03-03
Payer: MEDICARE

## 2022-03-03 ENCOUNTER — TELEPHONE (OUTPATIENT)
Dept: PAIN MEDICINE | Facility: HOSPITAL | Age: 67
End: 2022-03-03
Payer: MEDICARE

## 2022-03-03 NOTE — TELEPHONE ENCOUNTER
I called Mr. Leonard and he states he tried to go 1.5 days without taking his opioid medication (Norco 5/325 mg). He states he started feeling restlessness and sweaty. He took one of his medications and his symptoms resolved.    I advised him to slow down on his opioid taper at this time. He is currently down to 10 OMME daily which is down from 35 OMME a few months ago. I suggested he trial the tizanidine when he gets the feelings of restlessness and sweatiness. He can take up to 16 mg at once, but I suggested he start with trial of 12 mg to see how this effects him. I also suggested he check his blood pressures at home considering his other BP medications. I also suggested he f/u with his psychiatrist for potential worsening of depression and anxiety that can occur with opioid tapering.

## 2022-03-03 NOTE — TELEPHONE ENCOUNTER
To whom it may concern,  I feel Mr. Leonard should abstain from jury duty because he is currently tapering opioids and having occasional withdrawal with this process, and I am concerned that jury duty may add further stress and inhibit our current opioid tapering plan.            Letter created and msg sent for patient to come in and  letter.       ER

## 2022-03-08 ENCOUNTER — OFFICE VISIT (OUTPATIENT)
Dept: PAIN MEDICINE | Facility: CLINIC | Age: 67
End: 2022-03-08
Payer: MEDICARE

## 2022-03-08 DIAGNOSIS — F11.90 CHRONIC, CONTINUOUS USE OF OPIOIDS: Primary | ICD-10-CM

## 2022-03-08 DIAGNOSIS — G47.01 INSOMNIA DUE TO MEDICAL CONDITION: ICD-10-CM

## 2022-03-08 DIAGNOSIS — M96.1 FAILED BACK SURGICAL SYNDROME: ICD-10-CM

## 2022-03-08 PROCEDURE — 99214 PR OFFICE/OUTPT VISIT, EST, LEVL IV, 30-39 MIN: ICD-10-PCS | Mod: 95,,, | Performed by: PHYSICAL MEDICINE & REHABILITATION

## 2022-03-08 PROCEDURE — 99214 OFFICE O/P EST MOD 30 MIN: CPT | Mod: 95,,, | Performed by: PHYSICAL MEDICINE & REHABILITATION

## 2022-03-08 NOTE — PROGRESS NOTES
"Pain Medicine  Telemedicine Virtual Visit    The patient location is: [place]  The chief complaint leading to consultation is: Tapering pain meds  Visit type: Virtual visit with synchronous audio and video  Total time spent with patient: 15 mins  Each patient to whom he or she provides medical services by telemedicine is:  (1) informed of the relationship between the physician and patient and the respective role of any other health care provider with respect to management of the patient; and (2) notified that he or she may decline to receive medical services by telemedicine and may withdraw from such care at any time.        Chief Complaint:   Chief Complaint   Patient presents with    opioid tapering       History of Present Illness: Horace Leonard is a 66 y.o. male here for low back pain.    Onset: Many years. He fell off a stack of [employer] when he was 13 years old. He had 2 lumbar surgeries, one in the 1992 and the 2nd in 2013.   Location: low back  Radiation: left posterior thigh  Timing: constant  Quality: Aching  Exacerbating Factors: lifting  Alleviating Factors: medications  Associated Symptoms: He feels "weak" in his legs because he is out of shape. He feels abnormal sensation in the top of his left foot. He denies night fever/night sweats, urinary incontinence, bowel incontinence and significant weight loss    Severity: Currently: 3/10   Typical Range: 3-6/10     Exacerbation: 6/10     Today, pain is not his main complaint. His main issue is that he wants to get off of chronic opioids, but has has trouble in the past trying to do this. His previous physician tried to switch him to all short acting medications, but he felt worse on this initially.     Patient had records from previous Pain Medicine provider in [place], Dr. Eliel Nicole MD, who attempted to switch him to Methadone, but he was not able to fill this medication. Per record review, he appears stable on his current pain medications with no " documentation of aberrant behavior. Prior UDS consistent with prescribed medications.       Interval History (01/11/2022):  Horace Leonard returns today for follow up.  At the last clinic visit, decreased Norco 5/325 mg from q 6 hr to q 8 hrs and cont baclofen 20 mg TID prn and added tizanidine 4 mg q 8 hrs prn as well to help with opioid taper.     Currently, he is taking the Norco 5/325 mg twice a day, which he takes as half a tablet 4 times per day and started this about a week ago. He does not feel like he is doing well with this. He is having jitteriness and insomnia during the night. He is taking the baclofen as 1 tablet usually and takes it about 4 times a day. He is taking the tizanidine mostly at night. He does feel weaker in his muscles when he takes the baclofen, but the tizanidine makes him feels sleepy. He denies diarrhea. He does feel like he is having more pain. He will get a backache when he lays down at night.     He is hoping he could have something to sleep longer during the night.     Interval History (02/17/2022):  Horace Leonard returns today for follow up.  At the last clinic visit, changed norco to 5/325 mg BID prn, cont baclofen 20 mg TID, and cont tizanidine 4 mg TID prn, and started hydroxyzine for sleep.     He feels like he is currently having worsening depression.  He is currently taking Norco 5-325 milligrams, and he takes a half a tablet 3-4 times daily.  He is still having significant issues trying to fall asleep despite hydroxyzine.  He continues to take baclofen during the day and occasionally tizanidine 4 milligrams at night.  He denies any other side effects from this medication.  He is planning on seeing psychiatry to try to address his worsening depression.    Currently, the left sided low back pain is stable.      Patient is currently taking Norco which provides 50% relief.      Current Pain Scales:  Current: 2/10              Typical Range: 2-3/10      Interval History  (03/08/2022):  Horace Leonard returns today for follow up.  At the last clinic visit, cont Norco 5/325 mg q 12 hrs prn as he was having difficulty tapering further, cont baclofen 20 mg TID prn, and changed tizanidine to 4-8 mg qHS prn.     In the interim, he tried to discontinue the norco completely and started going through withdrawal with sweatiness, jitteriness, irritability. He took one of his Norco's and symptoms resolved.     He saw his psychiatrist who increased his viibryd to 40 mg daily and if still having issues increase the olanzapine. His psychiatrist feels the baclofen may be worsening his depression.     Currently, he is taking Norco 5/325 mg and using a half tablet 3-4 times per day, usually just 3. He is not having any symptoms of withdrawal, but is concerned about the next step in the taper.                Previous Interventions:  -     Previous Therapies:  PT/OT: yes but not in a while  Relevant Surgery: yes    - 1992: Lumbar laminectomy    - 1994: Cervical fusion   - 2013: Lumbar fusion: ALIF L3-4, L4-5, L5-S1 in [place]  Previous Medications:   - NSAIDS: tylenol  - Muscle Relaxants:  Tizanidine. Baclofen.   - TCAs:   - SNRIs:   - Topicals:   - Anticonvulsants:    - Opioids: MS contin. Hydrocodone.     Current Pain Medications:  1. Norco 5/325mg q 12 hrs PRN  (takes half tablet 3-4 times per day)  2. Baclofen 20 mg BID prn  3. Tizanidine 4-8 mg qHS prn    Blood Thinners: ASA 81 mg    Full Medication List:    Current Outpatient Medications:     amLODIPine (NORVASC) 5 MG tablet, Take 5 mg by mouth once daily., Disp: , Rfl:     aspirin 81 MG Chew, Take 81 mg by mouth once daily., Disp: , Rfl:     baclofen (LIORESAL) 10 MG tablet, Take 1-2 tablets (10-20 mg total) by mouth 3 (three) times daily as needed (pain, spasms)., Disp: 180 tablet, Rfl: 2    carvediloL (COREG) 25 MG tablet, Take 25 mg by mouth 2 (two) times daily with meals., Disp: , Rfl:     finasteride (PROSCAR) 5 mg tablet, Take 5 mg by  mouth once daily., Disp: , Rfl:     HYDROcodone-acetaminophen (NORCO) 5-325 mg per tablet, Take 1 tablet by mouth every 12 (twelve) hours as needed for Pain., Disp: 60 tablet, Rfl: 0    levothyroxine (SYNTHROID) 50 MCG tablet, Take 50 mcg by mouth before breakfast., Disp: , Rfl:     OLANZapine (ZYPREXA) 2.5 MG tablet, Take 2.5 mg by mouth every evening., Disp: , Rfl:     potassium chloride (MICRO-K) 10 MEQ CpSR, Take 10 mEq by mouth once., Disp: , Rfl:     rosuvastatin (CRESTOR) 20 MG tablet, Take 20 mg by mouth once daily., Disp: , Rfl:     tiZANidine (ZANAFLEX) 4 MG tablet, Take 1-2 tablets (4-8 mg total) by mouth nightly as needed (pain, muscle spasm, insomnia)., Disp: 60 tablet, Rfl: 5    valsartan (DIOVAN) 160 MG tablet, Take 160 mg by mouth once daily., Disp: , Rfl:     vilazodone (VIIBRYD) 20 mg Tab, Take 40 mg by mouth once daily., Disp: , Rfl:      Review of Systems:  Review of Systems   Neurological: Positive for tingling and weakness.   Psychiatric/Behavioral: Positive for depression.       Allergies:  Ace inhibitors     Medical History:   has a past medical history of CAD (coronary artery disease), Depression, and HTN (hypertension).    Surgical History:   has a past surgical history that includes Back surgery and Neck surgery.    Family History:  family history is not on file.    Social History:   reports that he has never smoked. He has never used smokeless tobacco.    Physical Exam:  There were no vitals taken for this visit.  GEN: No acute distress. Calm, comfortable  HENT: Normocephalic, atraumatic, moist mucous membranes  EYE: Anicteric sclera, non-injected.   CV: Non-diaphoretic.   RESP: Breathing comfortably. Chest expansion symmetric.  SKIN: No visible rashes or lesions of exposed skin.   PSYCH: Pleasant mood and appropriate affect. Recent and remote memory intact.         Imaging:  - MRI Lumbar spine 1/23/13:  Signal and position of the conus.  The visualized lower thoracic region is  unremarkable there is some straightening of the typical lumbar lordosis with minimal degenerative retrolisthesis at L4-5 and L5-S1.  Endplate degenerative changes are noted at the L3-4 through L5-S1 levels but there is no suspicious marrow signal abnormality to suggest an acute fracture or bony destructive process.  The paraspinous musculature and soft tissues are unremarkable.  There is ligamentum flavum and facet hypertrophic changes in the lower lumbar spine, most advanced at L4-5 and L5-S1.  No active synovitis identified on STIR imaging.  No large facet joint effusion.  L1-2:  Mild disc bulging.  L2-3: Significant loss of disc space height with disc dessication. Only mild disc bulging and no advanced stenosis.  L3-4: Significant loss of disc space height. Moderate symmetric disc bulging. Moderate narrowing of the central canal and lateral recesses. Moderate right foraminal stenosis and mild left foraminal stenosis.  L4-5: disc desiccation with significant loss of disc space height. Symmetric disc bulging. Mild narrowing of the central canal and lateral recesses. Moderate right and mild left foraminal stenosis.   L5-S1    Labs:  BMP  No results found for: NA, K, CL, CO2, BUN, CREATININE, CALCIUM, ANIONGAP, ESTGFRAFRICA, EGFRNONAA  No results found for: ALT, AST, GGT, ALKPHOS, BILITOT  No results found for: PLT    Assessment:  Horace Leonard is a 66 y.o. male with the following diagnoses based on history, exam, and imaging:    Problem List Items Addressed This Visit    None     Visit Diagnoses     Chronic, continuous use of opioids    -  Primary    Failed back surgical syndrome        Insomnia due to medical condition              This is a pleasant 66 y.o. gentleman presenting with:     - Chronic low back pain: Prior lumbar surgery with ALIF L3-S1. Back pain not his predominant complaint. He does still having findings of a left S1 radiculopathy on exam.   - Chronic opioid use: His goal is to wean off of these  medications, but has had trouble doing this in the past.   - Comorbidities: Depression. Insomnia.     Treatment Plan:   - PT/OT/HEP: Discussed benefits of exercise for pain and withdrawal symptoms. He does find it helps, but cannot do this at night.   - Procedures: None at this time. Consider caudal ROSMERY in future, but main goal is getting off opioids at this point and pain is less of an issue.   - Medications:    - Cont Norco 5/325 mg q 12 hrs prn. #60. Takes 1/2 tablet 3-4 times per day. Will continue taper slowly when depression is better controlled and when he is ready.    -  reviewed.    - Trial decrease in baclofen 20 mg BID prn to just at night to see if this improves his mood per psychiatrist concerns   - Pain contract signed 09/14/2021    - Cont Tizanidine to 4-8 mg qHS prn. He can take 3-4 tablets as well if still having issues falling asleep.    - Imaging: Reviewed. Consider updating lumbar MRI prior to any procedures if needed.   - Labs: Reviewed    Follow Up: RTC in 1.5 month for VV    Kaycee Cordova M.D.  Interventional Pain Medicine / Physical Medicine & Rehabilitation    Disclaimer: This note was partly generated using dictation software which may occasionally result in transcription errors.

## 2022-03-29 DIAGNOSIS — F11.90 CHRONIC, CONTINUOUS USE OF OPIOIDS: ICD-10-CM

## 2022-03-29 DIAGNOSIS — M54.16 LUMBAR RADICULOPATHY: ICD-10-CM

## 2022-03-29 DIAGNOSIS — M96.1 FAILED BACK SURGICAL SYNDROME: ICD-10-CM

## 2022-03-29 RX ORDER — HYDROCODONE BITARTRATE AND ACETAMINOPHEN 5; 325 MG/1; MG/1
1 TABLET ORAL EVERY 12 HOURS PRN
Qty: 60 TABLET | Refills: 0 | Status: SHIPPED | OUTPATIENT
Start: 2022-03-29 | End: 2022-05-02 | Stop reason: SDUPTHER

## 2022-03-29 NOTE — TELEPHONE ENCOUNTER
----- Message from Venessa Morales sent at 3/29/2022  2:35 PM CDT -----  Contact: [phone]/ Self  Type: Requesting to speak with nurse    Who Called: PT  Regarding: requesting a refill HYDROcodone-acetaminophen (NORCO) 5-325 mg per tablet  Would the patient rather a call back or a response via [facility]? Call back  Best Call Back Number: [phone]  Additional Information: [employer] and [facility]

## 2022-04-14 ENCOUNTER — OFFICE VISIT (OUTPATIENT)
Dept: PAIN MEDICINE | Facility: CLINIC | Age: 67
End: 2022-04-14
Payer: MEDICARE

## 2022-04-14 DIAGNOSIS — F11.90 CHRONIC, CONTINUOUS USE OF OPIOIDS: Primary | ICD-10-CM

## 2022-04-14 DIAGNOSIS — G47.01 INSOMNIA DUE TO MEDICAL CONDITION: ICD-10-CM

## 2022-04-14 DIAGNOSIS — G89.4 CHRONIC PAIN SYNDROME: ICD-10-CM

## 2022-04-14 DIAGNOSIS — M54.16 LUMBAR RADICULOPATHY: ICD-10-CM

## 2022-04-14 DIAGNOSIS — M96.1 FAILED BACK SURGICAL SYNDROME: ICD-10-CM

## 2022-04-14 PROCEDURE — 99214 OFFICE O/P EST MOD 30 MIN: CPT | Mod: 95,,, | Performed by: PHYSICAL MEDICINE & REHABILITATION

## 2022-04-14 PROCEDURE — 99214 PR OFFICE/OUTPT VISIT, EST, LEVL IV, 30-39 MIN: ICD-10-PCS | Mod: 95,,, | Performed by: PHYSICAL MEDICINE & REHABILITATION

## 2022-04-14 RX ORDER — GABAPENTIN 300 MG/1
300-900 CAPSULE ORAL NIGHTLY
Qty: 90 CAPSULE | Refills: 11 | Status: SHIPPED | OUTPATIENT
Start: 2022-04-14 | End: 2022-05-12

## 2022-04-14 NOTE — PROGRESS NOTES
"Pain Medicine  Telemedicine Virtual Visit    The patient location is: [place]  The chief complaint leading to consultation is: Tapering pain meds  Visit type: Virtual visit with synchronous audio and video  Total time spent with patient: 18 mins  Each patient to whom he or she provides medical services by telemedicine is:  (1) informed of the relationship between the physician and patient and the respective role of any other health care provider with respect to management of the patient; and (2) notified that he or she may decline to receive medical services by telemedicine and may withdraw from such care at any time.        Chief Complaint:   Chief Complaint   Patient presents with    Medication Management       History of Present Illness: Horace Leonard is a 66 y.o. male here for low back pain.    Onset: Many years. He fell off a stack of [employer] when he was 13 years old. He had 2 lumbar surgeries, one in the 1992 and the 2nd in 2013.   Location: low back  Radiation: left posterior thigh  Timing: constant  Quality: Aching  Exacerbating Factors: lifting  Alleviating Factors: medications  Associated Symptoms: He feels "weak" in his legs because he is out of shape. He feels abnormal sensation in the top of his left foot. He denies night fever/night sweats, urinary incontinence, bowel incontinence and significant weight loss    Severity: Currently: 3/10   Typical Range: 3-6/10     Exacerbation: 6/10     Today, pain is not his main complaint. His main issue is that he wants to get off of chronic opioids, but has has trouble in the past trying to do this. His previous physician tried to switch him to all short acting medications, but he felt worse on this initially.     Patient had records from previous Pain Medicine provider in [place], Dr. Eliel Nicole MD, who attempted to switch him to Methadone, but he was not able to fill this medication. Per record review, he appears stable on his current pain medications " with no documentation of aberrant behavior. Prior UDS consistent with prescribed medications.     Interval History (04/14/2022):  Horace Leonard returns today for follow up.  At the last clinic visit, cont Norco 5/325 mg q 12 hrs prn, takes 1/2 tablet 3-4 times per day. We decreased baclofen 20 mg to just at night to see if this improves his mood per psychiatrist concerns, cont Tizanidine to 4-8 mg qHS prn. He can take 3-4 tablets as well if still having issues falling asleep.      Currently, he is taking 3 1/2 tablets of the Norco. He is having trouble at night with restlessness, tingling sensation. He takes the baclofen and tizanidine at times at night and not sure if they are helping.  He is taking 2 baclofen at night does feel like this was helping more.  He is only taking 1 of the tizanidine at night.  He has had dizziness before during the day, but this was several weeks ago and is not sure if it was related to the tizanidine.    He still gets and back pain occasionally, but nothing too concerning for him.               Previous Interventions:  -     Previous Therapies:  PT/OT: yes but not in a while  Relevant Surgery: yes    - 1992: Lumbar laminectomy    - 1994: Cervical fusion   - 2013: Lumbar fusion: ALIF L3-4, L4-5, L5-S1 in [place]  Previous Medications:   - NSAIDS: tylenol  - Muscle Relaxants:  Tizanidine. Baclofen.   - TCAs:   - SNRIs:   - Topicals:   - Anticonvulsants:    - Opioids: MS contin. Hydrocodone.     Current Pain Medications:  1. Norco 5/325mg q 12 hrs PRN  (takes half tablet 3-4 times per day)  2. Baclofen 20 mg BID prn  3. Tizanidine 4-8 mg qHS prn    Blood Thinners: ASA 81 mg    Full Medication List:    Current Outpatient Medications:     amLODIPine (NORVASC) 5 MG tablet, Take 5 mg by mouth once daily., Disp: , Rfl:     aspirin 81 MG Chew, Take 81 mg by mouth once daily., Disp: , Rfl:     baclofen (LIORESAL) 10 MG tablet, Take 1-2 tablets (10-20 mg total) by mouth 3 (three) times daily  as needed (pain, spasms)., Disp: 180 tablet, Rfl: 2    carvediloL (COREG) 25 MG tablet, Take 25 mg by mouth 2 (two) times daily with meals., Disp: , Rfl:     finasteride (PROSCAR) 5 mg tablet, Take 5 mg by mouth once daily., Disp: , Rfl:     gabapentin (NEURONTIN) 300 MG capsule, Take 1-3 capsules (300-900 mg total) by mouth every evening., Disp: 90 capsule, Rfl: 11    HYDROcodone-acetaminophen (NORCO) 5-325 mg per tablet, Take 1 tablet by mouth every 12 (twelve) hours as needed for Pain., Disp: 60 tablet, Rfl: 0    levothyroxine (SYNTHROID) 50 MCG tablet, Take 50 mcg by mouth before breakfast., Disp: , Rfl:     OLANZapine (ZYPREXA) 2.5 MG tablet, Take 2.5 mg by mouth every evening., Disp: , Rfl:     potassium chloride (MICRO-K) 10 MEQ CpSR, Take 10 mEq by mouth once., Disp: , Rfl:     rosuvastatin (CRESTOR) 20 MG tablet, Take 20 mg by mouth once daily., Disp: , Rfl:     tiZANidine (ZANAFLEX) 4 MG tablet, Take 1-2 tablets (4-8 mg total) by mouth nightly as needed (pain, muscle spasm, insomnia)., Disp: 60 tablet, Rfl: 5    valsartan (DIOVAN) 160 MG tablet, Take 160 mg by mouth once daily., Disp: , Rfl:     vilazodone (VIIBRYD) 20 mg Tab, Take 40 mg by mouth once daily., Disp: , Rfl:      Review of Systems:  Review of Systems   Neurological: Positive for tingling and weakness.   Psychiatric/Behavioral: Positive for depression.       Allergies:  Ace inhibitors     Medical History:   has a past medical history of CAD (coronary artery disease), Depression, and HTN (hypertension).    Surgical History:   has a past surgical history that includes Back surgery and Neck surgery.    Family History:  family history is not on file.    Social History:   reports that he has never smoked. He has never used smokeless tobacco.    Physical Exam:  There were no vitals taken for this visit.  GEN: No acute distress. Calm, comfortable  HENT: Normocephalic, atraumatic, moist mucous membranes  EYE: Anicteric sclera, non-injected.    CV: Non-diaphoretic.   RESP: Breathing comfortably. Chest expansion symmetric.  SKIN: No visible rashes or lesions of exposed skin.   PSYCH: Pleasant mood and appropriate affect. Recent and remote memory intact.         Imaging:  - MRI Lumbar spine 1/23/13:  Signal and position of the conus.  The visualized lower thoracic region is unremarkable there is some straightening of the typical lumbar lordosis with minimal degenerative retrolisthesis at L4-5 and L5-S1.  Endplate degenerative changes are noted at the L3-4 through L5-S1 levels but there is no suspicious marrow signal abnormality to suggest an acute fracture or bony destructive process.  The paraspinous musculature and soft tissues are unremarkable.  There is ligamentum flavum and facet hypertrophic changes in the lower lumbar spine, most advanced at L4-5 and L5-S1.  No active synovitis identified on STIR imaging.  No large facet joint effusion.  L1-2:  Mild disc bulging.  L2-3: Significant loss of disc space height with disc dessication. Only mild disc bulging and no advanced stenosis.  L3-4: Significant loss of disc space height. Moderate symmetric disc bulging. Moderate narrowing of the central canal and lateral recesses. Moderate right foraminal stenosis and mild left foraminal stenosis.  L4-5: disc desiccation with significant loss of disc space height. Symmetric disc bulging. Mild narrowing of the central canal and lateral recesses. Moderate right and mild left foraminal stenosis.   L5-S1      Labs:  BMP  No results found for: NA, K, CL, CO2, BUN, CREATININE, CALCIUM, ANIONGAP, ESTGFRAFRICA, EGFRNONAA  No results found for: ALT, AST, GGT, ALKPHOS, BILITOT  No results found for: PLT    Assessment:  Horace Leonard is a 66 y.o. male with the following diagnoses based on history, exam, and imaging:    Problem List Items Addressed This Visit        Neuro    Lumbar radiculopathy    Relevant Medications    gabapentin (NEURONTIN) 300 MG capsule      Other  Visit Diagnoses     Chronic, continuous use of opioids    -  Primary    Relevant Medications    gabapentin (NEURONTIN) 300 MG capsule    Chronic pain syndrome        Relevant Medications    gabapentin (NEURONTIN) 300 MG capsule    Failed back surgical syndrome        Relevant Medications    gabapentin (NEURONTIN) 300 MG capsule    Insomnia due to medical condition        Relevant Medications    gabapentin (NEURONTIN) 300 MG capsule          This is a pleasant 66 y.o. gentleman presenting with:     - Chronic low back pain: Prior lumbar surgery with ALIF L3-S1. Back pain not his predominant complaint. He does still having findings of a left S1 radiculopathy on exam.   - Chronic opioid use: His goal is to wean off of these medications, but has had trouble doing this in the past.   - Comorbidities: Depression. Insomnia.     Treatment Plan:   - PT/OT/HEP: Discussed benefits of exercise for pain and withdrawal symptoms. He does find it helps, but cannot do this at night.   - Procedures: None at this time. Consider caudal ROSMERY in future, but main goal is getting off opioids at this point and pain is less of an issue.   - Medications:    - Cont Norco 5/325 mg q 12 hrs prn. #60. Takes 1/2 tablet 3-4 times per day. Will continue taper slowly when he is ready.    -  reviewed.    - Cont baclofen 20 mg qHS prn    - Pain contract signed 09/14/2021    - Cont Tizanidine to 4-8 mg qHS prn. He can take 3-4 tablets as well if still having issues falling asleep.     - Trial gabapentin 300 mg qHS and can go up to 900 mg qHS  - Imaging: Reviewed. Consider updating lumbar MRI prior to any procedures if needed.   - Labs: Patient     Follow Up: RTC in 1 month     Kaycee Cordova M.D.  Interventional Pain Medicine / Physical Medicine & Rehabilitation    Disclaimer: This note was partly generated using dictation software which may occasionally result in transcription errors.

## 2022-04-14 NOTE — PATIENT INSTRUCTIONS
You can take 2 tizanidine if still having restlessness at night. If that is ineffective, you can take 3 tizanidine and if that is ineffective, you can go up to 4 tizanidine 4 mg tablets at night.       I am also going to give you gabapentin 300 mg tablets. You can start with 300 mg at night to see if this helps with the restlessness, tingling sensations at night. If 1 tablets is ineffective, you can take two 300 mg tablets the following night. If 2 tablets is ineffective, you can take three 300 mg tablets the following night.

## 2022-05-02 DIAGNOSIS — F11.90 CHRONIC, CONTINUOUS USE OF OPIOIDS: ICD-10-CM

## 2022-05-02 DIAGNOSIS — M96.1 FAILED BACK SURGICAL SYNDROME: ICD-10-CM

## 2022-05-02 DIAGNOSIS — M54.16 LUMBAR RADICULOPATHY: ICD-10-CM

## 2022-05-02 RX ORDER — HYDROCODONE BITARTRATE AND ACETAMINOPHEN 5; 325 MG/1; MG/1
1 TABLET ORAL EVERY 12 HOURS PRN
Qty: 60 TABLET | Refills: 0 | Status: SHIPPED | OUTPATIENT
Start: 2022-05-02 | End: 2022-06-14 | Stop reason: SDUPTHER

## 2022-05-02 NOTE — TELEPHONE ENCOUNTER
----- Message from Betzy Carrasco sent at 5/2/2022 12:18 PM CDT -----  Needs advice from nurse:      Who Called:pt  Regarding:needs refill on HYDROcodone-acetaminophen (NORCO) 5-325 mg per tablet  Take 1 tablet by mouth every 12 (twelve) hours as needed for Pain      Would the patient rather a call back or [facility]IA [facility]?  Best Call Back number:[phone]  Additional Info:[id number] - [place], [place] - [address] AT [address]  & [facility] (Ph: [phone])

## 2022-05-12 ENCOUNTER — OFFICE VISIT (OUTPATIENT)
Dept: PAIN MEDICINE | Facility: CLINIC | Age: 67
End: 2022-05-12
Payer: MEDICARE

## 2022-05-12 DIAGNOSIS — M96.1 FAILED BACK SURGICAL SYNDROME: ICD-10-CM

## 2022-05-12 DIAGNOSIS — M54.16 LUMBAR RADICULOPATHY: ICD-10-CM

## 2022-05-12 DIAGNOSIS — F11.90 CHRONIC, CONTINUOUS USE OF OPIOIDS: ICD-10-CM

## 2022-05-12 DIAGNOSIS — G47.01 INSOMNIA DUE TO MEDICAL CONDITION: ICD-10-CM

## 2022-05-12 DIAGNOSIS — G89.4 CHRONIC PAIN SYNDROME: ICD-10-CM

## 2022-05-12 PROCEDURE — 99214 PR OFFICE/OUTPT VISIT, EST, LEVL IV, 30-39 MIN: ICD-10-PCS | Mod: 95,,, | Performed by: PHYSICAL MEDICINE & REHABILITATION

## 2022-05-12 PROCEDURE — 99214 OFFICE O/P EST MOD 30 MIN: CPT | Mod: 95,,, | Performed by: PHYSICAL MEDICINE & REHABILITATION

## 2022-05-12 RX ORDER — GABAPENTIN 300 MG/1
600 CAPSULE ORAL 3 TIMES DAILY
Qty: 180 CAPSULE | Refills: 11 | Status: SHIPPED | OUTPATIENT
Start: 2022-05-12 | End: 2022-08-24

## 2022-05-12 NOTE — PATIENT INSTRUCTIONS
Gabapentin is a neuropathic pain medication. The most common side effect is sedation and dizziness. To avoid these side effects, we will slowly increase the dose as follows:   Day 1: Take 2 tablets at night  Day 2: Take 1 tablet in the morning and 2 tablets at night  Day 3: Take 1 tablet in the morning, 1 tablet at lunch, and 2 tablets at night  Day 4: Take 1 tablet in the morning, 1 tablet at lunch, and 2 tablets at night  Day 5: Take 2 tablets in the morning, 1 tablet at lunch, and 2 tablets at night  Day 6: Take 2 tablets in the morning, 2 tablets at lunch, and 2 tablets at night    If at any point you are feeling side effects, you may decrease to the previous days dose. Please contact our clinic with any questions or concerns.

## 2022-05-12 NOTE — PROGRESS NOTES
"Pain Medicine  Telemedicine Virtual Visit    The patient location is: [place]  The chief complaint leading to consultation is: Tapering pain meds  Visit type: Virtual visit with synchronous audio and video  Total time spent with patient: 15 mins  Each patient to whom he or she provides medical services by telemedicine is:  (1) informed of the relationship between the physician and patient and the respective role of any other health care provider with respect to management of the patient; and (2) notified that he or she may decline to receive medical services by telemedicine and may withdraw from such care at any time.        Chief Complaint:   Chief Complaint   Patient presents with    Medication Refill       History of Present Illness: Horace Leonard is a 66 y.o. male here for low back pain.    Onset: Many years. He fell off a stack of [employer] when he was 13 years old. He had 2 lumbar surgeries, one in the 1992 and the 2nd in 2013.   Location: low back  Radiation: left posterior thigh  Timing: constant  Quality: Aching  Exacerbating Factors: lifting  Alleviating Factors: medications  Associated Symptoms: He feels "weak" in his legs because he is out of shape. He feels abnormal sensation in the top of his left foot. He denies night fever/night sweats, urinary incontinence, bowel incontinence and significant weight loss    Severity: Currently: 3/10   Typical Range: 3-6/10     Exacerbation: 6/10     Today, pain is not his main complaint. His main issue is that he wants to get off of chronic opioids, but has has trouble in the past trying to do this. His previous physician tried to switch him to all short acting medications, but he felt worse on this initially.     Patient had records from previous Pain Medicine provider in [place], Dr. Eliel Nicole MD, who attempted to switch him to Methadone, but he was not able to fill this medication. Per record review, he appears stable on his current pain medications with no " documentation of aberrant behavior. Prior UDS consistent with prescribed medications.     Interval History (04/14/2022):  Horace Leonard returns today for follow up.  At the last clinic visit, cont Norco 5/325 mg q 12 hrs prn, takes 1/2 tablet 3-4 times per day. We decreased baclofen 20 mg to just at night to see if this improves his mood per psychiatrist concerns, cont Tizanidine to 4-8 mg qHS prn. He can take 3-4 tablets as well if still having issues falling asleep.      Currently, he is taking 3 1/2 tablets of the Norco. He is having trouble at night with restlessness, tingling sensation. He takes the baclofen and tizanidine at times at night and not sure if they are helping.  He is taking 2 baclofen at night does feel like this was helping more.  He is only taking 1 of the tizanidine at night.  He has had dizziness before during the day, but this was several weeks ago and is not sure if it was related to the tizanidine.    He still gets and back pain occasionally, but nothing too concerning for him.     Interval History (05/12/2022):  Horace Leonard returns today for follow up.  At the last clinic visit, cont Norco 5/325 mg BID prn (takes 1/2 tab 3-4 times per day) cont baclofen, cont tizanidine, and trialled gabapentin 300-900 mg at night.     He feels the gabapentin is helping him at night and he will take 1-2 tablets at night. He will still take baclofen 1-2 at night.     He is having less issues with agitation, irritability, and restlessness since starting the gabapentin.  He is still taking 3 half tablets of the hydrocodone per day, but wants to start trying a half tablet twice a day or 1 tablet once a day.  Pain is still not his biggest complaint, but main goal of getting off of chronic opioids.            Previous Interventions:  -     Previous Therapies:  PT/OT: yes but not in a while  Relevant Surgery: yes    - 1992: Lumbar laminectomy    - 1994: Cervical fusion   - 2013: Lumbar fusion: ALIF L3-4,  L4-5, L5-S1 in [place]  Previous Medications:   - NSAIDS: tylenol  - Muscle Relaxants:  Tizanidine. Baclofen.   - TCAs:   - SNRIs:   - Topicals:   - Anticonvulsants:    - Opioids: MS contin. Hydrocodone.     Current Pain Medications:  1. Norco 5/325mg q 12 hrs PRN  (takes half tablet 3-4 times per day)  2. Baclofen 20 mg BID prn  3. Tizanidine 4-8 mg qHS prn - rarely takes  4. Gabapentin 600-900 mg qHS    Blood Thinners: ASA 81 mg    Full Medication List:    Current Outpatient Medications:     amLODIPine (NORVASC) 5 MG tablet, Take 5 mg by mouth once daily., Disp: , Rfl:     aspirin 81 MG Chew, Take 81 mg by mouth once daily., Disp: , Rfl:     baclofen (LIORESAL) 10 MG tablet, Take 1-2 tablets (10-20 mg total) by mouth 3 (three) times daily as needed (pain, spasms)., Disp: 180 tablet, Rfl: 2    carvediloL (COREG) 25 MG tablet, Take 25 mg by mouth 2 (two) times daily with meals., Disp: , Rfl:     finasteride (PROSCAR) 5 mg tablet, Take 5 mg by mouth once daily., Disp: , Rfl:     gabapentin (NEURONTIN) 300 MG capsule, Take 2 capsules (600 mg total) by mouth 3 (three) times daily., Disp: 180 capsule, Rfl: 11    HYDROcodone-acetaminophen (NORCO) 5-325 mg per tablet, Take 1 tablet by mouth every 12 (twelve) hours as needed for Pain., Disp: 60 tablet, Rfl: 0    levothyroxine (SYNTHROID) 50 MCG tablet, Take 50 mcg by mouth before breakfast., Disp: , Rfl:     OLANZapine (ZYPREXA) 2.5 MG tablet, Take 2.5 mg by mouth every evening., Disp: , Rfl:     potassium chloride (MICRO-K) 10 MEQ CpSR, Take 10 mEq by mouth once., Disp: , Rfl:     rosuvastatin (CRESTOR) 20 MG tablet, Take 20 mg by mouth once daily., Disp: , Rfl:     tiZANidine (ZANAFLEX) 4 MG tablet, Take 1-2 tablets (4-8 mg total) by mouth nightly as needed (pain, muscle spasm, insomnia)., Disp: 60 tablet, Rfl: 5    valsartan (DIOVAN) 160 MG tablet, Take 160 mg by mouth once daily., Disp: , Rfl:     vilazodone (VIIBRYD) 20 mg Tab, Take 40 mg by mouth once  daily., Disp: , Rfl:      Review of Systems:  Review of Systems   Neurological: Positive for tingling and weakness.   Psychiatric/Behavioral: Positive for depression.       Allergies:  Ace inhibitors     Medical History:   has a past medical history of CAD (coronary artery disease), Depression, and HTN (hypertension).    Surgical History:   has a past surgical history that includes Back surgery and Neck surgery.    Family History:  family history is not on file.    Social History:   reports that he has never smoked. He has never used smokeless tobacco.    Physical Exam:  There were no vitals taken for this visit.  GEN: No acute distress. Calm, comfortable  HENT: Normocephalic, atraumatic, moist mucous membranes  EYE: Anicteric sclera, non-injected.   CV: Non-diaphoretic.   RESP: Breathing comfortably. Chest expansion symmetric.  SKIN: No visible rashes or lesions of exposed skin.   PSYCH: Pleasant mood and appropriate affect. Recent and remote memory intact.         Imaging:  - MRI Lumbar spine 1/23/13:  Signal and position of the conus.  The visualized lower thoracic region is unremarkable there is some straightening of the typical lumbar lordosis with minimal degenerative retrolisthesis at L4-5 and L5-S1.  Endplate degenerative changes are noted at the L3-4 through L5-S1 levels but there is no suspicious marrow signal abnormality to suggest an acute fracture or bony destructive process.  The paraspinous musculature and soft tissues are unremarkable.  There is ligamentum flavum and facet hypertrophic changes in the lower lumbar spine, most advanced at L4-5 and L5-S1.  No active synovitis identified on STIR imaging.  No large facet joint effusion.  L1-2:  Mild disc bulging.  L2-3: Significant loss of disc space height with disc dessication. Only mild disc bulging and no advanced stenosis.  L3-4: Significant loss of disc space height. Moderate symmetric disc bulging. Moderate narrowing of the central canal and  lateral recesses. Moderate right foraminal stenosis and mild left foraminal stenosis.  L4-5: disc desiccation with significant loss of disc space height. Symmetric disc bulging. Mild narrowing of the central canal and lateral recesses. Moderate right and mild left foraminal stenosis.   L5-S1      Labs:  BMP  No results found for: NA, K, CL, CO2, BUN, CREATININE, CALCIUM, ANIONGAP, ESTGFRAFRICA, EGFRNONAA  No results found for: ALT, AST, GGT, ALKPHOS, BILITOT  No results found for: PLT    Assessment:  Horace Leonard is a 66 y.o. male with the following diagnoses based on history, exam, and imaging:    Problem List Items Addressed This Visit        Neuro    Lumbar radiculopathy    Relevant Medications    gabapentin (NEURONTIN) 300 MG capsule      Other Visit Diagnoses     Chronic pain syndrome        Relevant Medications    gabapentin (NEURONTIN) 300 MG capsule    Failed back surgical syndrome        Relevant Medications    gabapentin (NEURONTIN) 300 MG capsule    Chronic, continuous use of opioids        Relevant Medications    gabapentin (NEURONTIN) 300 MG capsule    Insomnia due to medical condition        Relevant Medications    gabapentin (NEURONTIN) 300 MG capsule          This is a pleasant 66 y.o. gentleman presenting with:     - Chronic low back pain: Prior lumbar surgery with ALIF L3-S1. Back pain not his predominant complaint. He does still having findings of a left S1 radiculopathy on exam.   - Chronic opioid use: His goal is to wean off of these medications, but has had trouble doing this in the past.   - Comorbidities: Depression. Insomnia.     Treatment Plan:   - PT/OT/HEP: Discussed benefits of exercise for pain and withdrawal symptoms. He does find it helps, but cannot do this at night.   - Procedures: None at this time. Consider caudal ROSMERY in future, but main goal is getting off opioids at this point and pain is less of an issue.   - Medications:    - Cont Norco 5/325 mg q 12 hrs prn. #60. He takes  1/2 tablet 3 times per day, and we will start trying to take 1/2 tablet twice a day as needed, but will fill the full #60 in case he needs it. Will continue taper slowly when he is ready.    -  reviewed.    - Cont baclofen 20 mg qHS prn    - Pain contract signed 09/14/2021    - Cont Tizanidine to 4-8 mg qHS prn. He can take 3-4 tablets as well if still having issues falling asleep.     - Increase gabapentin 600 mg TID and titration provided. He can take as 300-600-900 if needed.   - Imaging: Reviewed. Consider updating lumbar MRI prior to any procedures if needed.   - Labs: Patient     Follow Up: RTC in 1 month     Kaycee Cordova M.D.  Interventional Pain Medicine / Physical Medicine & Rehabilitation    Disclaimer: This note was partly generated using dictation software which may occasionally result in transcription errors.

## 2022-05-25 ENCOUNTER — PATIENT MESSAGE (OUTPATIENT)
Dept: PAIN MEDICINE | Facility: CLINIC | Age: 67
End: 2022-05-25
Payer: MEDICARE

## 2022-05-25 DIAGNOSIS — M54.16 LUMBAR RADICULOPATHY: ICD-10-CM

## 2022-05-25 DIAGNOSIS — M53.86 DECREASED RANGE OF MOTION OF LUMBAR SPINE: Primary | ICD-10-CM

## 2022-05-25 DIAGNOSIS — M96.1 FAILED BACK SURGICAL SYNDROME: ICD-10-CM

## 2022-05-26 ENCOUNTER — PATIENT MESSAGE (OUTPATIENT)
Dept: PAIN MEDICINE | Facility: CLINIC | Age: 67
End: 2022-05-26
Payer: MEDICARE

## 2022-06-14 ENCOUNTER — OFFICE VISIT (OUTPATIENT)
Dept: PAIN MEDICINE | Facility: CLINIC | Age: 67
End: 2022-06-14
Payer: MEDICARE

## 2022-06-14 VITALS — HEART RATE: 81 BPM | SYSTOLIC BLOOD PRESSURE: 116 MMHG | DIASTOLIC BLOOD PRESSURE: 86 MMHG

## 2022-06-14 DIAGNOSIS — F11.90 CHRONIC, CONTINUOUS USE OF OPIOIDS: ICD-10-CM

## 2022-06-14 DIAGNOSIS — M53.86 DECREASED RANGE OF MOTION OF LUMBAR SPINE: ICD-10-CM

## 2022-06-14 DIAGNOSIS — M54.16 LUMBAR RADICULOPATHY: ICD-10-CM

## 2022-06-14 DIAGNOSIS — R29.898 WEAKNESS OF LEFT LEG: ICD-10-CM

## 2022-06-14 DIAGNOSIS — M96.1 FAILED BACK SURGICAL SYNDROME: ICD-10-CM

## 2022-06-14 PROCEDURE — 99999 PR PBB SHADOW E&M-EST. PATIENT-LVL III: ICD-10-PCS | Mod: PBBFAC,,, | Performed by: PHYSICAL MEDICINE & REHABILITATION

## 2022-06-14 PROCEDURE — 99215 OFFICE O/P EST HI 40 MIN: CPT | Mod: S$PBB,,, | Performed by: PHYSICAL MEDICINE & REHABILITATION

## 2022-06-14 PROCEDURE — 99215 PR OFFICE/OUTPT VISIT, EST, LEVL V, 40-54 MIN: ICD-10-PCS | Mod: S$PBB,,, | Performed by: PHYSICAL MEDICINE & REHABILITATION

## 2022-06-14 PROCEDURE — 99999 PR PBB SHADOW E&M-EST. PATIENT-LVL III: CPT | Mod: PBBFAC,,, | Performed by: PHYSICAL MEDICINE & REHABILITATION

## 2022-06-14 PROCEDURE — 99213 OFFICE O/P EST LOW 20 MIN: CPT | Mod: PBBFAC,PO | Performed by: PHYSICAL MEDICINE & REHABILITATION

## 2022-06-14 RX ORDER — HYDROCODONE BITARTRATE AND ACETAMINOPHEN 5; 325 MG/1; MG/1
1 TABLET ORAL EVERY 12 HOURS PRN
Qty: 60 TABLET | Refills: 0 | Status: SHIPPED | OUTPATIENT
Start: 2022-06-14 | End: 2022-07-19 | Stop reason: SDUPTHER

## 2022-06-14 RX ORDER — BACLOFEN 10 MG/1
10-20 TABLET ORAL NIGHTLY
Qty: 60 TABLET | Refills: 5 | Status: SHIPPED | OUTPATIENT
Start: 2022-06-14

## 2022-06-14 NOTE — PATIENT INSTRUCTIONS
Gabapentin is a neuropathic pain medication. The most common side effect is sedation and dizziness. To avoid this slowly increase the dose as follows:   Day 1: Take 1 tablet at night  Day 3: Take 1 tablet in the morning and 1 tablet at night  Day 5: Take 1 tablet in the morning, 1 tablet at lunch, and 1 tablet at night  Day 7: Take 1 tablet in the morning, 1 tablet at lunch, and 2 tablets at night  Day 9: Take 2 tablets in the morning, 1 tablet at lunch, and 2 tablets at night  Day 11: Take 2 tablets in the morning, 2 tablets at lunch, and 2 tablets at night    If at any point you are feeling side effects, you may decrease to the previous days dose. Please contact our clinic with any questions or concerns.

## 2022-06-14 NOTE — PROGRESS NOTES
"Pain Medicine      Chief Complaint:   Chief Complaint   Patient presents with    Low-back Pain    Medication Refill       History of Present Illness: Horace Leonard is a 66 y.o. male here for low back pain.    Onset: Many years. He fell off a stack of [employer] when he was 13 years old. He had 2 lumbar surgeries, one in the 1992 and the 2nd in 2013.   Location: low back  Radiation: left posterior thigh  Timing: constant  Quality: Aching  Exacerbating Factors: lifting  Alleviating Factors: medications  Associated Symptoms: He feels "weak" in his legs because he is out of shape. He feels abnormal sensation in the top of his left foot. He denies night fever/night sweats, urinary incontinence, bowel incontinence and significant weight loss    Severity: Currently: 3/10   Typical Range: 3-6/10     Exacerbation: 6/10     Today, pain is not his main complaint. His main issue is that he wants to get off of chronic opioids, but has has trouble in the past trying to do this. His previous physician tried to switch him to all short acting medications, but he felt worse on this initially.     Patient had records from previous Pain Medicine provider in [place], Dr. Eliel Nicole MD, who attempted to switch him to Methadone, but he was not able to fill this medication. Per record review, he appears stable on his current pain medications with no documentation of aberrant behavior. Prior UDS consistent with prescribed medications.     Interval History (04/14/2022):  Horace Leonard returns today for follow up.  At the last clinic visit, cont Norco 5/325 mg q 12 hrs prn, takes 1/2 tablet 3-4 times per day. We decreased baclofen 20 mg to just at night to see if this improves his mood per psychiatrist concerns, cont Tizanidine to 4-8 mg qHS prn. He can take 3-4 tablets as well if still having issues falling asleep.      Currently, he is taking 3 1/2 tablets of the Norco. He is having trouble at night with restlessness, tingling " sensation. He takes the baclofen and tizanidine at times at night and not sure if they are helping.  He is taking 2 baclofen at night does feel like this was helping more.  He is only taking 1 of the tizanidine at night.  He has had dizziness before during the day, but this was several weeks ago and is not sure if it was related to the tizanidine.    He still gets and back pain occasionally, but nothing too concerning for him.     Interval History (05/12/2022):  Horace Leonard returns today for follow up.  At the last clinic visit, cont Norco 5/325 mg BID prn (takes 1/2 tab 3-4 times per day) cont baclofen, cont tizanidine, and trialled gabapentin 300-900 mg at night.     He feels the gabapentin is helping him at night and he will take 1-2 tablets at night. He will still take baclofen 1-2 at night.     He is having less issues with agitation, irritability, and restlessness since starting the gabapentin.  He is still taking 3 half tablets of the hydrocodone per day, but wants to start trying a half tablet twice a day or 1 tablet once a day.  Pain is still not his biggest complaint, but main goal of getting off of chronic opioids.    Interval History (06/14/2022):  Horace Leonard returns today for follow up.  At the last clinic visit, cont norco, cont baclofen, cont tizanidine and increased gabapentin.     He was feeling more physically/mentally slowed, and this was also commented on by his psychiatrist.  Psychiatrist attempt to taper down his olanzapine, and he had more energy after that, but after couple weeks he started to have worsening of depression as well.  He is to follow-up with his psychiatrist later today.    Currently, the low back pain is stable.  He really notes minimal low back pain, is mostly trying to taper his chronic opioids.    Patient is currently taking Norco which provides 100% relief when he takes this.  He is been taking 3 half tablets daily.  He feels like his mood, restlessness, insomnia are  still present, but slowly improving.  He denies other symptoms of withdrawal.  He is feeling overall frustrated with how long this process is taking.    He has almost completely stopped taking his tizanidine.  He rarely takes gabapentin.  He is utilizing 1-2 tablets of baclofen at night.    Current Pain Scales:  Current: 3/10              Typical Range: 3-4/10      Pain Disability Index  Family/Home Responsibilities:: 7  Recreation:: 6  Social Activity:: 5  Occupation:: 5  Sexual Behavior:: 0  Self Care:: 1  Life-Support Activities:: 1  Pain Disability Index (PDI): 25       Previous Interventions:  -     Previous Therapies:  PT/OT: yes but not in a while  Relevant Surgery: yes    - 1992: Lumbar laminectomy    - 1994: Cervical fusion   - 2013: Lumbar fusion: ALIF L3-4, L4-5, L5-S1 in [place]  Previous Medications:   - NSAIDS: tylenol  - Muscle Relaxants:  Tizanidine. Baclofen.   - TCAs:   - SNRIs:   - Topicals:   - Anticonvulsants:    - Opioids: MS contin. Hydrocodone.     Current Pain Medications:  1. Norco 5/325mg q 12 hrs PRN  (takes half tablet 3-4 times per day)  2. Baclofen 20 mg BID prn  3. Tizanidine 4-8 mg qHS prn - rarely takes  4. Gabapentin 600-900 mg qHS    Blood Thinners: ASA 81 mg    Full Medication List:    Current Outpatient Medications:     amLODIPine (NORVASC) 5 MG tablet, Take 5 mg by mouth once daily., Disp: , Rfl:     aspirin 81 MG Chew, Take 81 mg by mouth once daily., Disp: , Rfl:     carvediloL (COREG) 25 MG tablet, Take 25 mg by mouth 2 (two) times daily with meals., Disp: , Rfl:     finasteride (PROSCAR) 5 mg tablet, Take 5 mg by mouth once daily., Disp: , Rfl:     gabapentin (NEURONTIN) 300 MG capsule, Take 2 capsules (600 mg total) by mouth 3 (three) times daily., Disp: 180 capsule, Rfl: 11    levothyroxine (SYNTHROID) 50 MCG tablet, Take 50 mcg by mouth before breakfast., Disp: , Rfl:     OLANZapine (ZYPREXA) 2.5 MG tablet, Take 2.5 mg by mouth every evening., Disp: , Rfl:      potassium chloride (MICRO-K) 10 MEQ CpSR, Take 10 mEq by mouth once., Disp: , Rfl:     rosuvastatin (CRESTOR) 20 MG tablet, Take 20 mg by mouth once daily., Disp: , Rfl:     valsartan (DIOVAN) 160 MG tablet, Take 160 mg by mouth once daily., Disp: , Rfl:     vilazodone (VIIBRYD) 20 mg Tab, Take 40 mg by mouth once daily., Disp: , Rfl:     baclofen (LIORESAL) 10 MG tablet, Take 1-2 tablets (10-20 mg total) by mouth every evening., Disp: 60 tablet, Rfl: 5    HYDROcodone-acetaminophen (NORCO) 5-325 mg per tablet, Take 1 tablet by mouth every 12 (twelve) hours as needed for Pain., Disp: 60 tablet, Rfl: 0     Review of Systems:  Review of Systems   Neurological: Positive for tingling and weakness.   Psychiatric/Behavioral: Positive for depression.       Allergies:  Ace inhibitors     Medical History:   has a past medical history of CAD (coronary artery disease), Depression, and HTN (hypertension).    Surgical History:   has a past surgical history that includes Back surgery and Neck surgery.    Family History:  family history is not on file.    Social History:   reports that he has never smoked. He has never used smokeless tobacco.    Physical Exam:  /86   Pulse 81   GEN: No acute distress. Calm, comfortable  HENT: Normocephalic, atraumatic, moist mucous membranes  EYE: Anicteric sclera, non-injected.   CV: Non-diaphoretic.   RESP: Breathing comfortably. Chest expansion symmetric.  SKIN: No visible rashes or lesions of exposed skin.   PSYCH: Pleasant mood and appropriate affect. Recent and remote memory intact.         Imaging:  - MRI Lumbar spine 1/23/13:  Signal and position of the conus.  The visualized lower thoracic region is unremarkable there is some straightening of the typical lumbar lordosis with minimal degenerative retrolisthesis at L4-5 and L5-S1.  Endplate degenerative changes are noted at the L3-4 through L5-S1 levels but there is no suspicious marrow signal abnormality to suggest an acute  fracture or bony destructive process.  The paraspinous musculature and soft tissues are unremarkable.  There is ligamentum flavum and facet hypertrophic changes in the lower lumbar spine, most advanced at L4-5 and L5-S1.  No active synovitis identified on STIR imaging.  No large facet joint effusion.  L1-2:  Mild disc bulging.  L2-3: Significant loss of disc space height with disc dessication. Only mild disc bulging and no advanced stenosis.  L3-4: Significant loss of disc space height. Moderate symmetric disc bulging. Moderate narrowing of the central canal and lateral recesses. Moderate right foraminal stenosis and mild left foraminal stenosis.  L4-5: disc desiccation with significant loss of disc space height. Symmetric disc bulging. Mild narrowing of the central canal and lateral recesses. Moderate right and mild left foraminal stenosis.   L5-S1      Labs:  BMP  No results found for: NA, K, CL, CO2, BUN, CREATININE, CALCIUM, ANIONGAP, ESTGFRAFRICA, EGFRNONAA  No results found for: ALT, AST, GGT, ALKPHOS, BILITOT  No results found for: PLT    Assessment:  Horace Leonard is a 66 y.o. male with the following diagnoses based on history, exam, and imaging:    Problem List Items Addressed This Visit        Neuro    Lumbar radiculopathy    Relevant Medications    baclofen (LIORESAL) 10 MG tablet    HYDROcodone-acetaminophen (NORCO) 5-325 mg per tablet    Decreased range of motion of lumbar spine    Relevant Medications    baclofen (LIORESAL) 10 MG tablet       Orthopedic    Weakness of left leg    Relevant Medications    baclofen (LIORESAL) 10 MG tablet      Other Visit Diagnoses     Failed back surgical syndrome        Relevant Medications    baclofen (LIORESAL) 10 MG tablet    HYDROcodone-acetaminophen (NORCO) 5-325 mg per tablet    Chronic, continuous use of opioids        Relevant Medications    HYDROcodone-acetaminophen (NORCO) 5-325 mg per tablet          This is a pleasant 66 y.o. gentleman presenting with:      - Chronic low back pain: Prior lumbar surgery with ALIF L3-S1. Back pain not his predominant complaint. He does still having findings of a left S1 radiculopathy on exam.   - Chronic opioid use: His goal is to wean off of these medications, but has had trouble doing this in the past.   - Comorbidities: Depression. Insomnia.     Treatment Plan:   - PT/OT/HEP: Discussed benefits of exercise for pain and withdrawal symptoms. He does find it helps, but cannot do this at night.    - He is planning on starting PT soon.   - Procedures: None at this time. Consider caudal ROSMERY in future, but main goal is getting off opioids at this point and pain is less of an issue.   - Medications:    - Cont Norco 5/325 mg q 12 hrs prn. #60. He takes 1/2 tablet 3 times per day, and we will start trying to take 1/2 tablet twice a day as needed, but will fill the full #60 in case he needs it. Will continue taper slowly when he is ready.    - Discussed possible switch to buprenorphine, but patient wants to hold off on that for now.    -  reviewed.    - Cont baclofen 10-20 mg qHS prn    - Pain contract signed 09/14/2021    - DC Tizanidine 4-8 mg qHS prn.    - Retitrate gabapentin to 600 mg TID. Titration provided.   - Imaging: Reviewed. Consider updating lumbar MRI prior to any procedures if needed.   - Labs: Patient     Follow Up: RTC in 1 month for VV    I spent greater than 44 minutes in total in todays visit including face-to-face time with the patient, and time reviewing records/imaging/labs, and documenting.       Kaycee Cordova M.D.  Interventional Pain Medicine / Physical Medicine & Rehabilitation    Disclaimer: This note was partly generated using dictation software which may occasionally result in transcription errors.

## 2022-07-19 ENCOUNTER — OFFICE VISIT (OUTPATIENT)
Dept: PAIN MEDICINE | Facility: CLINIC | Age: 67
End: 2022-07-19
Payer: MEDICARE

## 2022-07-19 DIAGNOSIS — M96.1 FAILED BACK SURGICAL SYNDROME: ICD-10-CM

## 2022-07-19 DIAGNOSIS — M54.16 LUMBAR RADICULOPATHY: ICD-10-CM

## 2022-07-19 DIAGNOSIS — F11.90 CHRONIC, CONTINUOUS USE OF OPIOIDS: ICD-10-CM

## 2022-07-19 PROCEDURE — 99215 PR OFFICE/OUTPT VISIT, EST, LEVL V, 40-54 MIN: ICD-10-PCS | Mod: 95,,, | Performed by: PHYSICAL MEDICINE & REHABILITATION

## 2022-07-19 PROCEDURE — 99215 OFFICE O/P EST HI 40 MIN: CPT | Mod: 95,,, | Performed by: PHYSICAL MEDICINE & REHABILITATION

## 2022-07-19 RX ORDER — HYDROCODONE BITARTRATE AND ACETAMINOPHEN 5; 325 MG/1; MG/1
1 TABLET ORAL EVERY 12 HOURS PRN
Qty: 60 TABLET | Refills: 0 | Status: SHIPPED | OUTPATIENT
Start: 2022-07-19 | End: 2022-08-24 | Stop reason: SDUPTHER

## 2022-07-19 RX ORDER — BUPROPION HYDROCHLORIDE 100 MG/1
100 TABLET, EXTENDED RELEASE ORAL DAILY
COMMUNITY

## 2022-07-19 RX ORDER — BUPRENORPHINE HCL 75 MCG
75 FILM, MEDICATED (EA) BUCCAL 2 TIMES DAILY
Qty: 60 EACH | Refills: 0 | Status: SHIPPED | OUTPATIENT
Start: 2022-07-19

## 2022-07-19 NOTE — PATIENT INSTRUCTIONS
I want you to continue taking the Hydrocodone 5/325 mg half tablet three times a day as needed in addition to taking the scheduled buprenorphine buccal film twice daily. You may begin to feel over the first 3 days that you no longer need to take the hydrocodone and in that case you may discontinue the hydrocodone. I am going to follow-up with you in 1 week to see how you are doing.

## 2022-07-19 NOTE — PROGRESS NOTES
"Pain Medicine  Telemedicine Virtual Visit    The patient location is: [place]  The chief complaint leading to consultation is: Back pain and Chronic Opioid Use  Visit type: Virtual visit with synchronous audio and video  Total time spent with patient: 45 mins  Each patient to whom he or she provides medical services by telemedicine is:  (1) informed of the relationship between the physician and patient and the respective role of any other health care provider with respect to management of the patient; and (2) notified that he or she may decline to receive medical services by telemedicine and may withdraw from such care at any time.        Chief Complaint:   Chief Complaint   Patient presents with    Medication Refill       History of Present Illness: Horace Leonard is a 66 y.o. male here for low back pain.    Onset: Many years. He fell off a stack of [employer] when he was 13 years old. He had 2 lumbar surgeries, one in the 1992 and the 2nd in 2013.   Location: low back  Radiation: left posterior thigh  Timing: constant  Quality: Aching  Exacerbating Factors: lifting  Alleviating Factors: medications  Associated Symptoms: He feels "weak" in his legs because he is out of shape. He feels abnormal sensation in the top of his left foot. He denies night fever/night sweats, urinary incontinence, bowel incontinence and significant weight loss    Severity: Currently: 3/10   Typical Range: 3-6/10     Exacerbation: 6/10     Today, pain is not his main complaint. His main issue is that he wants to get off of chronic opioids, but has has trouble in the past trying to do this. His previous physician tried to switch him to all short acting medications, but he felt worse on this initially.     Patient had records from previous Pain Medicine provider in [place], Dr. Eliel Nicole MD, who attempted to switch him to Methadone, but he was not able to fill this medication. Per record review, he appears stable on his current pain " medications with no documentation of aberrant behavior. Prior UDS consistent with prescribed medications.     Interval History (04/14/2022):  Horace Leonard returns today for follow up.  At the last clinic visit, cont Norco 5/325 mg q 12 hrs prn, takes 1/2 tablet 3-4 times per day. We decreased baclofen 20 mg to just at night to see if this improves his mood per psychiatrist concerns, cont Tizanidine to 4-8 mg qHS prn. He can take 3-4 tablets as well if still having issues falling asleep.      Currently, he is taking 3 1/2 tablets of the Norco. He is having trouble at night with restlessness, tingling sensation. He takes the baclofen and tizanidine at times at night and not sure if they are helping.  He is taking 2 baclofen at night does feel like this was helping more.  He is only taking 1 of the tizanidine at night.  He has had dizziness before during the day, but this was several weeks ago and is not sure if it was related to the tizanidine.    He still gets and back pain occasionally, but nothing too concerning for him.     Interval History (05/12/2022):  Horace Leonard returns today for follow up.  At the last clinic visit, cont Norco 5/325 mg BID prn (takes 1/2 tab 3-4 times per day) cont baclofen, cont tizanidine, and trialled gabapentin 300-900 mg at night.     He feels the gabapentin is helping him at night and he will take 1-2 tablets at night. He will still take baclofen 1-2 at night.     He is having less issues with agitation, irritability, and restlessness since starting the gabapentin.  He is still taking 3 half tablets of the hydrocodone per day, but wants to start trying a half tablet twice a day or 1 tablet once a day.  Pain is still not his biggest complaint, but main goal of getting off of chronic opioids.    Interval History (06/14/2022):  Horace Leonard returns today for follow up.  At the last clinic visit, cont norco, cont baclofen, cont tizanidine and increased gabapentin.     He was feeling  more physically/mentally slowed, and this was also commented on by his psychiatrist.  Psychiatrist attempt to taper down his olanzapine, and he had more energy after that, but after couple weeks he started to have worsening of depression as well.  He is to follow-up with his psychiatrist later today.    Currently, the low back pain is stable.  He really notes minimal low back pain, is mostly trying to taper his chronic opioids.    Patient is currently taking Norco which provides 100% relief when he takes this.  He is been taking 3 half tablets daily.  He feels like his mood, restlessness, insomnia are still present, but slowly improving.  He denies other symptoms of withdrawal.  He is feeling overall frustrated with how long this process is taking.    He has almost completely stopped taking his tizanidine.  He rarely takes gabapentin.  He is utilizing 1-2 tablets of baclofen at night.    Current Pain Scales:  Current: 3/10              Typical Range: 3-4/10    Interval History (07/19/2022):  Horace Leonard returns today for follow up.  At the last clinic visit, cont current norco, cont balcofen 10-20 mg qHS, DC-ed tizanidine and retitrated gabapentin to 600 mg TID.     He is taking the gabapentin 600 mg TID, baclofen occasionally,     He f/u with his psychiatrist, Dr. Davis, who started him on.     Currently, the pain is controlled, and he feels like the feelings of restlessness are not apparent when he takes 3 half tablets of the hydrocodone. He denies any side effects of his medications at this time.               Previous Interventions:  -     Previous Therapies:  PT/OT: yes but not in a while  Relevant Surgery: yes    - 1992: Lumbar laminectomy    - 1994: Cervical fusion   - 2013: Lumbar fusion: ALIF L3-4, L4-5, L5-S1 in [place]  Previous Medications:   - NSAIDS: tylenol  - Muscle Relaxants:  Tizanidine. Baclofen.   - TCAs:   - SNRIs:   - Topicals:   - Anticonvulsants:    - Opioids: MS contin. Hydrocodone.      Current Pain Medications:  1. Norco 5/325mg q 12 hrs PRN  (takes half tablet 3-4 times per day)  2. Baclofen 10-20 mg qHS prn  3. Tizanidine 4-8 mg qHS prn - rarely takes  4. Gabapentin 600 mg TID    Blood Thinners: ASA 81 mg    Full Medication List:    Current Outpatient Medications:     buPROPion (WELLBUTRIN SR) 100 MG TBSR 12 hr tablet, Take 100 mg by mouth once daily., Disp: , Rfl:     amLODIPine (NORVASC) 5 MG tablet, Take 5 mg by mouth once daily., Disp: , Rfl:     aspirin 81 MG Chew, Take 81 mg by mouth once daily., Disp: , Rfl:     baclofen (LIORESAL) 10 MG tablet, Take 1-2 tablets (10-20 mg total) by mouth every evening., Disp: 60 tablet, Rfl: 5    buprenorphine HCL 75 mcg Film, Place 75 mcg inside cheek 2 (two) times daily., Disp: 60 each, Rfl: 0    carvediloL (COREG) 25 MG tablet, Take 25 mg by mouth 2 (two) times daily with meals., Disp: , Rfl:     finasteride (PROSCAR) 5 mg tablet, Take 5 mg by mouth once daily., Disp: , Rfl:     gabapentin (NEURONTIN) 300 MG capsule, Take 2 capsules (600 mg total) by mouth 3 (three) times daily., Disp: 180 capsule, Rfl: 11    HYDROcodone-acetaminophen (NORCO) 5-325 mg per tablet, Take 1 tablet by mouth every 12 (twelve) hours as needed for Pain., Disp: 60 tablet, Rfl: 0    levothyroxine (SYNTHROID) 50 MCG tablet, Take 50 mcg by mouth before breakfast., Disp: , Rfl:     OLANZapine (ZYPREXA) 2.5 MG tablet, Take 2.5 mg by mouth every evening., Disp: , Rfl:     potassium chloride (MICRO-K) 10 MEQ CpSR, Take 10 mEq by mouth once., Disp: , Rfl:     rosuvastatin (CRESTOR) 20 MG tablet, Take 20 mg by mouth once daily., Disp: , Rfl:     valsartan (DIOVAN) 160 MG tablet, Take 160 mg by mouth once daily., Disp: , Rfl:     vilazodone (VIIBRYD) 20 mg Tab, Take 40 mg by mouth once daily., Disp: , Rfl:      Review of Systems:  Review of Systems   Neurological: Positive for tingling and weakness.   Psychiatric/Behavioral: Positive for depression.        Allergies:  Ace inhibitors     Medical History:   has a past medical history of CAD (coronary artery disease), Depression, and HTN (hypertension).    Surgical History:   has a past surgical history that includes Back surgery and Neck surgery.    Family History:  family history is not on file.    Social History:   reports that he has never smoked. He has never used smokeless tobacco.    Physical Exam:  There were no vitals taken for this visit.  GEN: No acute distress. Calm, comfortable  HENT: Normocephalic, atraumatic, moist mucous membranes  EYE: Anicteric sclera, non-injected.   CV: Non-diaphoretic.   RESP: Breathing comfortably. Chest expansion symmetric.  SKIN: No visible rashes or lesions of exposed skin.   PSYCH: Pleasant mood and appropriate affect. Recent and remote memory intact.         Imaging:  - MRI Lumbar spine 1/23/13:  Signal and position of the conus.  The visualized lower thoracic region is unremarkable there is some straightening of the typical lumbar lordosis with minimal degenerative retrolisthesis at L4-5 and L5-S1.  Endplate degenerative changes are noted at the L3-4 through L5-S1 levels but there is no suspicious marrow signal abnormality to suggest an acute fracture or bony destructive process.  The paraspinous musculature and soft tissues are unremarkable.  There is ligamentum flavum and facet hypertrophic changes in the lower lumbar spine, most advanced at L4-5 and L5-S1.  No active synovitis identified on STIR imaging.  No large facet joint effusion.  L1-2:  Mild disc bulging.  L2-3: Significant loss of disc space height with disc dessication. Only mild disc bulging and no advanced stenosis.  L3-4: Significant loss of disc space height. Moderate symmetric disc bulging. Moderate narrowing of the central canal and lateral recesses. Moderate right foraminal stenosis and mild left foraminal stenosis.  L4-5: disc desiccation with significant loss of disc space height. Symmetric disc  bulging. Mild narrowing of the central canal and lateral recesses. Moderate right and mild left foraminal stenosis.   L5-S1      Labs:  BMP  No results found for: NA, K, CL, CO2, BUN, CREATININE, CALCIUM, ANIONGAP, ESTGFRAFRICA, EGFRNONAA  No results found for: ALT, AST, GGT, ALKPHOS, BILITOT  No results found for: PLT    Assessment:  Horace Leonard is a 66 y.o. male with the following diagnoses based on history, exam, and imaging:    Problem List Items Addressed This Visit        Neuro    Lumbar radiculopathy    Relevant Medications    HYDROcodone-acetaminophen (NORCO) 5-325 mg per tablet    buprenorphine HCL 75 mcg Film      Other Visit Diagnoses     Failed back surgical syndrome        Relevant Medications    HYDROcodone-acetaminophen (NORCO) 5-325 mg per tablet    buprenorphine HCL 75 mcg Film    Chronic, continuous use of opioids        Relevant Medications    HYDROcodone-acetaminophen (NORCO) 5-325 mg per tablet    buprenorphine HCL 75 mcg Film          This is a pleasant 66 y.o. gentleman presenting with:     - Chronic low back pain: Prior lumbar surgery with ALIF L3-S1. Back pain not his predominant complaint. He does still having findings of a left S1 radiculopathy on exam.   - Chronic opioid use: His goal is to wean off of these medications, but has had trouble doing this in the past.   - Comorbidities: Depression. Insomnia.     Treatment Plan:   - PT/OT/HEP: Discussed benefits of exercise for pain and withdrawal symptoms. He does find it helps, but cannot do this at night.    - He is planning on starting PT soon.   - Procedures: None at this time. Consider caudal ROSMERY in future, but main goal is getting off opioids at this point and pain is less of an issue.   - Medications:    - Cont Norco 5/325 mg q 12 hrs prn. #60. He takes 1/2 tablet 3 times per day, and we will start trying to take 1/2 tablet twice a day as needed, but will fill the full #60 in case he needs it. Will continue taper slowly when he is  ready.    - Start buprenorphine Buccal film 75 mcg BID. Cont SA norco during induction. Risks of withdrawal discussed.    -  reviewed.    - Cont baclofen 10-20 mg qHS prn    - Pain contract signed 09/14/2021    - Cont gabapentin 600 mg TID. Titration provided.   - Imaging: Reviewed. Consider updating lumbar MRI prior to any procedures if needed.   - Labs: Patient     Follow Up: RTC in 1 week for VV    I spent greater than 45 minutes in total in todays visit including face-to-face time with the patient, and time reviewing records/imaging/labs, and documenting.       Kaycee Cordova M.D.  Interventional Pain Medicine / Physical Medicine & Rehabilitation    Disclaimer: This note was partly generated using dictation software which may occasionally result in transcription errors.

## 2022-07-20 ENCOUNTER — PATIENT MESSAGE (OUTPATIENT)
Dept: PAIN MEDICINE | Facility: CLINIC | Age: 67
End: 2022-07-20
Payer: MEDICARE

## 2022-07-26 ENCOUNTER — OFFICE VISIT (OUTPATIENT)
Dept: PAIN MEDICINE | Facility: CLINIC | Age: 67
End: 2022-07-26
Payer: MEDICARE

## 2022-07-26 DIAGNOSIS — G89.4 CHRONIC PAIN SYNDROME: ICD-10-CM

## 2022-07-26 DIAGNOSIS — F11.90 CHRONIC, CONTINUOUS USE OF OPIOIDS: Primary | ICD-10-CM

## 2022-07-26 PROCEDURE — 99213 OFFICE O/P EST LOW 20 MIN: CPT | Mod: 95,,, | Performed by: PHYSICAL MEDICINE & REHABILITATION

## 2022-07-26 PROCEDURE — 99213 PR OFFICE/OUTPT VISIT, EST, LEVL III, 20-29 MIN: ICD-10-PCS | Mod: 95,,, | Performed by: PHYSICAL MEDICINE & REHABILITATION

## 2022-07-26 NOTE — PROGRESS NOTES
"Pain Medicine   Telemedicine AUDIO ONLY Virtual Visit    The patient location is:  [place]  The chief complaint leading to consultation is: Med refill  Visit type: Virtual visit with audio only  Total time spent with patient: 10 mins  Each patient to whom he or she provides medical services by telemedicine is:  (1) informed of the relationship between the physician and patient and the respective role of any other health care provider with respect to management of the patient; and (2) notified that he or she may decline to receive medical services by telemedicine and may withdraw from such care at any time.    Notes: Horace Leonard is scheduled for a visit, but due to current Corona Virus Pandemic and goal of limiting patient and community exposure, a virtual visit is being performed in an attempt to limit risks of viral transmission.         Chief Complaint:   Chief Complaint   Patient presents with    Medication Refill    chronic opioid management       History of Present Illness: Horace Leonard is a 66 y.o. male here for low back pain.    Onset: Many years. He fell off a stack of [employer] when he was 13 years old. He had 2 lumbar surgeries, one in the 1992 and the 2nd in 2013.   Location: low back  Radiation: left posterior thigh  Timing: constant  Quality: Aching  Exacerbating Factors: lifting  Alleviating Factors: medications  Associated Symptoms: He feels "weak" in his legs because he is out of shape. He feels abnormal sensation in the top of his left foot. He denies night fever/night sweats, urinary incontinence, bowel incontinence and significant weight loss    Severity: Currently: 3/10   Typical Range: 3-6/10     Exacerbation: 6/10     Today, pain is not his main complaint. His main issue is that he wants to get off of chronic opioids, but has has trouble in the past trying to do this. His previous physician tried to switch him to all short acting medications, but he felt worse on this initially. "     Patient had records from previous Pain Medicine provider in [place], Dr. Eliel Nicole MD, who attempted to switch him to Methadone, but he was not able to fill this medication. Per record review, he appears stable on his current pain medications with no documentation of aberrant behavior. Prior UDS consistent with prescribed medications.     Interval History (07/26/2022):  Horace Leonard returns today for follow up.  At the last clinic visit, Rxed belbuca with plan to transition from Norco, but his insurance does not cover it.     Currently, the pain is unchanged. He is doing better with the 3 1/2 tablets of Norco 5/325 mg than he was previously. No withdrawal issues currently.               Previous Interventions:  -     Previous Therapies:  PT/OT: yes but not in a while  Relevant Surgery: yes    - 1992: Lumbar laminectomy    - 1994: Cervical fusion   - 2013: Lumbar fusion: ALIF L3-4, L4-5, L5-S1 in [place]  Previous Medications:   - NSAIDS: tylenol  - Muscle Relaxants:  Tizanidine. Baclofen.   - TCAs:   - SNRIs:   - Topicals:   - Anticonvulsants:    - Opioids: MS contin. Hydrocodone.     Current Pain Medications:  1. Norco 5/325mg q 12 hrs PRN  (takes half tablet 3-4 times per day)  2. Baclofen 10-20 mg qHS prn  3. Tizanidine 4-8 mg qHS prn - rarely takes  4. Gabapentin 600 mg TID    Blood Thinners: ASA 81 mg    Full Medication List:    Current Outpatient Medications:     amLODIPine (NORVASC) 5 MG tablet, Take 5 mg by mouth once daily., Disp: , Rfl:     aspirin 81 MG Chew, Take 81 mg by mouth once daily., Disp: , Rfl:     baclofen (LIORESAL) 10 MG tablet, Take 1-2 tablets (10-20 mg total) by mouth every evening., Disp: 60 tablet, Rfl: 5    buprenorphine HCL 75 mcg Film, Place 75 mcg inside cheek 2 (two) times daily., Disp: 60 each, Rfl: 0    buPROPion (WELLBUTRIN SR) 100 MG TBSR 12 hr tablet, Take 100 mg by mouth once daily., Disp: , Rfl:     carvediloL (COREG) 25 MG tablet, Take 25 mg by mouth 2 (two)  times daily with meals., Disp: , Rfl:     finasteride (PROSCAR) 5 mg tablet, Take 5 mg by mouth once daily., Disp: , Rfl:     gabapentin (NEURONTIN) 300 MG capsule, Take 2 capsules (600 mg total) by mouth 3 (three) times daily., Disp: 180 capsule, Rfl: 11    HYDROcodone-acetaminophen (NORCO) 5-325 mg per tablet, Take 1 tablet by mouth every 12 (twelve) hours as needed for Pain., Disp: 60 tablet, Rfl: 0    levothyroxine (SYNTHROID) 50 MCG tablet, Take 50 mcg by mouth before breakfast., Disp: , Rfl:     OLANZapine (ZYPREXA) 2.5 MG tablet, Take 2.5 mg by mouth every evening., Disp: , Rfl:     potassium chloride (MICRO-K) 10 MEQ CpSR, Take 10 mEq by mouth once., Disp: , Rfl:     rosuvastatin (CRESTOR) 20 MG tablet, Take 20 mg by mouth once daily., Disp: , Rfl:     valsartan (DIOVAN) 160 MG tablet, Take 160 mg by mouth once daily., Disp: , Rfl:     vilazodone (VIIBRYD) 20 mg Tab, Take 40 mg by mouth once daily., Disp: , Rfl:      Review of Systems:  Review of Systems   Neurological: Positive for tingling and weakness.   Psychiatric/Behavioral: Positive for depression.       Allergies:  Ace inhibitors     Medical History:   has a past medical history of CAD (coronary artery disease), Depression, and HTN (hypertension).    Surgical History:   has a past surgical history that includes Back surgery and Neck surgery.    Family History:  family history is not on file.    Social History:   reports that he has never smoked. He has never used smokeless tobacco.    Physical Exam:  There were no vitals taken for this visit.  GEN: No acute distress. Calm, comfortable  HENT: Normocephalic, atraumatic, moist mucous membranes  EYE: Anicteric sclera, non-injected.   CV: Non-diaphoretic.   RESP: Breathing comfortably. Chest expansion symmetric.  SKIN: No visible rashes or lesions of exposed skin.   PSYCH: Pleasant mood and appropriate affect. Recent and remote memory intact.         Imaging:  - MRI Lumbar spine 1/23/13:  Signal  and position of the conus.  The visualized lower thoracic region is unremarkable there is some straightening of the typical lumbar lordosis with minimal degenerative retrolisthesis at L4-5 and L5-S1.  Endplate degenerative changes are noted at the L3-4 through L5-S1 levels but there is no suspicious marrow signal abnormality to suggest an acute fracture or bony destructive process.  The paraspinous musculature and soft tissues are unremarkable.  There is ligamentum flavum and facet hypertrophic changes in the lower lumbar spine, most advanced at L4-5 and L5-S1.  No active synovitis identified on STIR imaging.  No large facet joint effusion.  L1-2:  Mild disc bulging.  L2-3: Significant loss of disc space height with disc dessication. Only mild disc bulging and no advanced stenosis.  L3-4: Significant loss of disc space height. Moderate symmetric disc bulging. Moderate narrowing of the central canal and lateral recesses. Moderate right foraminal stenosis and mild left foraminal stenosis.  L4-5: disc desiccation with significant loss of disc space height. Symmetric disc bulging. Mild narrowing of the central canal and lateral recesses. Moderate right and mild left foraminal stenosis.   L5-S1      Labs:  BMP  No results found for: NA, K, CL, CO2, BUN, CREATININE, CALCIUM, ANIONGAP, ESTGFRAFRICA, EGFRNONAA  No results found for: ALT, AST, GGT, ALKPHOS, BILITOT  No results found for: PLT    Assessment:  Horace Leonard is a 66 y.o. male with the following diagnoses based on history, exam, and imaging:    Problem List Items Addressed This Visit    None     Visit Diagnoses     Chronic, continuous use of opioids    -  Primary    Chronic pain syndrome              This is a pleasant 66 y.o. gentleman presenting with:     - Chronic low back pain: Prior lumbar surgery with ALIF L3-S1. Back pain not his predominant complaint. He does still having findings of a left S1 radiculopathy on exam.   - Chronic opioid use: His goal is to  wean off of these medications, but has had trouble doing this in the past.   - Comorbidities: Depression. Insomnia.     Treatment Plan:   - PT/OT/HEP: Discussed benefits of exercise for pain and withdrawal symptoms. He does find it helps, but cannot do this at night.    - He is planning on starting PT soon.   - Procedures: None at this time. Consider caudal ROSMERY in future, but main goal is getting off opioids at this point and pain is less of an issue.   - Medications:    - Cont Norco 5/325 mg q 12 hrs prn. #60. He takes 1/2 tablet 3 times per day, and we will start trying to take 1/2 tablet twice a day as needed, but will fill the full #60 in case he needs it. Will continue taper slowly when he is ready.    -  reviewed.    - Cont baclofen 10-20 mg qHS prn    - Pain contract signed 09/14/2021    - Cont gabapentin 600 mg TID. Titration provided.   - Imaging: Reviewed. Consider updating lumbar MRI prior to any procedures if needed.   - Labs: Patient     Follow Up: RTC in 2 months       Kaycee Cordova M.D.  Interventional Pain Medicine / Physical Medicine & Rehabilitation    Disclaimer: This note was partly generated using dictation software which may occasionally result in transcription errors.

## 2022-08-24 DIAGNOSIS — F11.90 CHRONIC, CONTINUOUS USE OF OPIOIDS: ICD-10-CM

## 2022-08-24 DIAGNOSIS — M54.16 LUMBAR RADICULOPATHY: ICD-10-CM

## 2022-08-24 DIAGNOSIS — M96.1 FAILED BACK SURGICAL SYNDROME: ICD-10-CM

## 2022-08-24 DIAGNOSIS — G89.4 CHRONIC PAIN SYNDROME: ICD-10-CM

## 2022-08-24 DIAGNOSIS — G47.01 INSOMNIA DUE TO MEDICAL CONDITION: ICD-10-CM

## 2022-08-24 RX ORDER — GABAPENTIN 300 MG/1
600 CAPSULE ORAL 3 TIMES DAILY
Qty: 180 CAPSULE | Refills: 11 | Status: SHIPPED | OUTPATIENT
Start: 2022-08-24 | End: 2023-08-19

## 2022-08-24 RX ORDER — HYDROCODONE BITARTRATE AND ACETAMINOPHEN 5; 325 MG/1; MG/1
1 TABLET ORAL EVERY 12 HOURS PRN
Qty: 60 TABLET | Refills: 0 | Status: SHIPPED | OUTPATIENT
Start: 2022-08-24 | End: 2022-09-22 | Stop reason: SDUPTHER

## 2022-08-24 NOTE — TELEPHONE ENCOUNTER
----- Message from Ivelisse Correa sent at 8/24/2022 10:29 AM CDT -----  Type:  RX Refill Request    Who Called:  Pt   Refill or New Rx: refill   RX Name and Strength: HYDROcodone-acetaminophen (NORCO) 5-325 mg per tablet  How is the patient currently taking it? (ex. 1XDay): every 12 hrs as needed for pain   Is this a 30 day or 90 day RX: 30   Preferred Pharmacy with phone number: [id number] [address] [facility]   Phone: [phone]  Fax:  [phone]  Would the patient rather a call back or a response via [facility]? Call back   Best Call Back Number: [phone]  Additional Information:

## 2022-09-21 ENCOUNTER — OFFICE VISIT (OUTPATIENT)
Dept: PAIN MEDICINE | Facility: CLINIC | Age: 67
End: 2022-09-21
Payer: MEDICARE

## 2022-09-21 VITALS
WEIGHT: 218.06 LBS | HEART RATE: 87 BPM | BODY MASS INDEX: 29.57 KG/M2 | SYSTOLIC BLOOD PRESSURE: 106 MMHG | DIASTOLIC BLOOD PRESSURE: 72 MMHG

## 2022-09-21 DIAGNOSIS — M53.86 DECREASED RANGE OF MOTION OF LUMBAR SPINE: ICD-10-CM

## 2022-09-21 DIAGNOSIS — F11.90 CHRONIC, CONTINUOUS USE OF OPIOIDS: ICD-10-CM

## 2022-09-21 DIAGNOSIS — G47.01 INSOMNIA DUE TO MEDICAL CONDITION: ICD-10-CM

## 2022-09-21 DIAGNOSIS — G89.4 CHRONIC PAIN SYNDROME: Primary | ICD-10-CM

## 2022-09-21 DIAGNOSIS — R29.898 WEAKNESS OF LEFT LEG: ICD-10-CM

## 2022-09-21 DIAGNOSIS — M54.16 LUMBAR RADICULOPATHY: ICD-10-CM

## 2022-09-21 DIAGNOSIS — M96.1 FAILED BACK SURGICAL SYNDROME: ICD-10-CM

## 2022-09-21 PROCEDURE — 99999 PR PBB SHADOW E&M-EST. PATIENT-LVL III: CPT | Mod: PBBFAC,,, | Performed by: NURSE PRACTITIONER

## 2022-09-21 PROCEDURE — 99214 PR OFFICE/OUTPT VISIT, EST, LEVL IV, 30-39 MIN: ICD-10-PCS | Mod: S$PBB,,, | Performed by: NURSE PRACTITIONER

## 2022-09-21 PROCEDURE — 99999 PR PBB SHADOW E&M-EST. PATIENT-LVL III: ICD-10-PCS | Mod: PBBFAC,,, | Performed by: NURSE PRACTITIONER

## 2022-09-21 PROCEDURE — 99214 OFFICE O/P EST MOD 30 MIN: CPT | Mod: S$PBB,,, | Performed by: NURSE PRACTITIONER

## 2022-09-21 PROCEDURE — 99213 OFFICE O/P EST LOW 20 MIN: CPT | Mod: PBBFAC,PO | Performed by: NURSE PRACTITIONER

## 2022-09-21 NOTE — PROGRESS NOTES
"Pain Medicine  Established clinic visits        Chief Complaint:   Chief Complaint   Patient presents with    Low-back Pain       History of Present Illness: Horace Leonard is a 66 y.o. male here for low back pain.    Onset: Many years. He fell off a stack of plywood when he was 13 years old. He had 2 lumbar surgeries, one in the 1992 and the 2nd in 2013.   Location: low back  Radiation: left posterior thigh  Timing: constant  Quality: Aching  Exacerbating Factors: lifting  Alleviating Factors: medications  Associated Symptoms: He feels "weak" in his legs because he is out of shape. He feels abnormal sensation in the top of his left foot. He denies night fever/night sweats, urinary incontinence, bowel incontinence and significant weight loss    Severity: Currently: 3/10   Typical Range: 3-6/10     Exacerbation: 6/10     Today, pain is not his main complaint. His main issue is that he wants to get off of chronic opioids, but has has trouble in the past trying to do this. His previous physician tried to switch him to all short acting medications, but he felt worse on this initially.     Patient had records from previous Pain Medicine provider in [place], Dr. Eliel Nicole MD, who attempted to switch him to Methadone, but he was not able to fill this medication. Per record review, he appears stable on his current pain medications with no documentation of aberrant behavior. Prior UDS consistent with prescribed medications.     Interval History  9/21/2022-Horace Leonard is a 66 y.o. male who  has a past medical history of CAD (coronary artery disease), Depression, and HTN (hypertension).  He Is presenting today for a medication refill, he was transitioned to [place] 5-325 b.I.d. p.r.n. per Dr. Cordova on last clinic visit.  He is reporting  relief of his pain at about 50% improvement in his functionality with the switch to Norco 5-325.  He denies any adverse side effects at this time.   No withdrawal issues currently " .       (07/26/2022):  Horace Leonard returns today for follow up.  At the last clinic visit, Rxed belbuca with plan to transition from Norco, but his insurance does not cover it.     Currently, the pain is unchanged. He is doing better with the 3 1/2 tablets of Norco 5/325 mg than he was previously. No withdrawal issues currently.       Pain Disability Index  Family/Home Responsibilities:: 5  Recreation:: 7  Social Activity:: 2  Occupation:: 5  Sexual Behavior:: 8  Self Care:: 2  Life-Support Activities:: 0  Pain Disability Index (PDI): 29       Previous Interventions:  -     Previous Therapies:  PT/OT: yes but not in a while  Relevant Surgery: yes    - 1992: Lumbar laminectomy    - 1994: Cervical fusion   - 2013: Lumbar fusion: ALIF L3-4, L4-5, L5-S1 in [place]  Previous Medications:   - NSAIDS: tylenol  - Muscle Relaxants:  Tizanidine. Baclofen.   - TCAs:   - SNRIs:   - Topicals:   - Anticonvulsants:    - Opioids: MS contin. Hydrocodone.     Current Pain Medications:  Norco 5/325mg q 12 hrs PRN  (takes half tablet 3-4 times per day)  Baclofen 10-20 mg qHS prn  Tizanidine 4-8 mg qHS prn - rarely takes  Gabapentin 600 mg TID    Blood Thinners: ASA 81 mg    Full Medication List:    Current Outpatient Medications:     amLODIPine (NORVASC) 5 MG tablet, Take 5 mg by mouth once daily., Disp: , Rfl:     aspirin 81 MG Chew, Take 81 mg by mouth once daily., Disp: , Rfl:     baclofen (LIORESAL) 10 MG tablet, Take 1-2 tablets (10-20 mg total) by mouth every evening., Disp: 60 tablet, Rfl: 5    buprenorphine HCL 75 mcg Film, Place 75 mcg inside cheek 2 (two) times daily., Disp: 60 each, Rfl: 0    buPROPion (WELLBUTRIN SR) 100 MG TBSR 12 hr tablet, Take 100 mg by mouth once daily., Disp: , Rfl:     carvediloL (COREG) 25 MG tablet, Take 25 mg by mouth 2 (two) times daily with meals., Disp: , Rfl:     finasteride (PROSCAR) 5 mg tablet, Take 5 mg by mouth once daily., Disp: , Rfl:     gabapentin (NEURONTIN) 300 MG capsule, Take 2  capsules (600 mg total) by mouth 3 (three) times daily., Disp: 180 capsule, Rfl: 11    HYDROcodone-acetaminophen (NORCO) 5-325 mg per tablet, Take 1 tablet by mouth every 12 (twelve) hours as needed for Pain., Disp: 60 tablet, Rfl: 0    levothyroxine (SYNTHROID) 50 MCG tablet, Take 50 mcg by mouth before breakfast., Disp: , Rfl:     OLANZapine (ZYPREXA) 2.5 MG tablet, Take 2.5 mg by mouth every evening., Disp: , Rfl:     potassium chloride (MICRO-K) 10 MEQ CpSR, Take 10 mEq by mouth once., Disp: , Rfl:     rosuvastatin (CRESTOR) 20 MG tablet, Take 20 mg by mouth once daily., Disp: , Rfl:     valsartan (DIOVAN) 160 MG tablet, Take 160 mg by mouth once daily., Disp: , Rfl:     vilazodone (VIIBRYD) 20 mg Tab, Take 40 mg by mouth once daily., Disp: , Rfl:      Review of Systems:  Review of Systems   Neurological:  Positive for tingling and weakness.   Psychiatric/Behavioral:  Positive for depression.      Allergies:  Ace inhibitors     Medical History:   has a past medical history of CAD (coronary artery disease), Depression, and HTN (hypertension).    Surgical History:   has a past surgical history that includes Back surgery and Neck surgery.    Family History:  family history is not on file.    Social History:   reports that he has never smoked. He has never used smokeless tobacco.    Physical Exam:  /72   Pulse 87   Wt 98.9 kg (218 lb 0.6 oz)   BMI 29.57 kg/m²   GEN: No acute distress. Calm, comfortable  HENT: Normocephalic, atraumatic, moist mucous membranes  EYE: Anicteric sclera, non-injected.   CV: Non-diaphoretic.   RESP: Breathing comfortably. Chest expansion symmetric.  SKIN: No visible rashes or lesions of exposed skin.   PSYCH: Pleasant mood and appropriate affect. Recent and remote memory intact.         Imaging:  - MRI Lumbar spine 1/23/13:  Signal and position of the conus.  The visualized lower thoracic region is unremarkable there is some straightening of the typical lumbar lordosis with  minimal degenerative retrolisthesis at L4-5 and L5-S1.  Endplate degenerative changes are noted at the L3-4 through L5-S1 levels but there is no suspicious marrow signal abnormality to suggest an acute fracture or bony destructive process.  The paraspinous musculature and soft tissues are unremarkable.  There is ligamentum flavum and facet hypertrophic changes in the lower lumbar spine, most advanced at L4-5 and L5-S1.  No active synovitis identified on STIR imaging.  No large facet joint effusion.  L1-2:  Mild disc bulging.  L2-3: Significant loss of disc space height with disc dessication. Only mild disc bulging and no advanced stenosis.  L3-4: Significant loss of disc space height. Moderate symmetric disc bulging. Moderate narrowing of the central canal and lateral recesses. Moderate right foraminal stenosis and mild left foraminal stenosis.  L4-5: disc desiccation with significant loss of disc space height. Symmetric disc bulging. Mild narrowing of the central canal and lateral recesses. Moderate right and mild left foraminal stenosis.   L5-S1      Labs:  BMP  No results found for: NA, K, CL, CO2, BUN, CREATININE, CALCIUM, ANIONGAP, ESTGFRAFRICA, EGFRNONAA  No results found for: ALT, AST, GGT, ALKPHOS, BILITOT  No results found for: PLT    Assessment:  Horace Leonard is a 66 y.o. male with the following diagnoses based on history, exam, and imaging:    Problem List Items Addressed This Visit          Neuro    Lumbar radiculopathy    Decreased range of motion of lumbar spine       Orthopedic    Weakness of left leg     Other Visit Diagnoses       Chronic pain syndrome    -  Primary    Failed back surgical syndrome        Chronic, continuous use of opioids        Insomnia due to medical condition                This is a pleasant 66 y.o. gentleman presenting with:     - Chronic low back pain: Prior lumbar surgery with ALIF L3-S1. Back pain not his predominant complaint. He does still having findings of a left S1  "radiculopathy on exam.   - Chronic opioid use: His goal is to wean off of these medications, but has had trouble doing this in the past.   - Comorbidities: Depression. Insomnia.       09/21/2022 - pleasant female with history of chronic low back pain see above for previous history he is currently being weaned off of his opioids he is currently on a stable low dose opioid regimen that consists of Norco 5-320 5q12 60 per month.  Last clinic visit he and Dr. Cordova agree that he was start taking 1/2 tablets  twice a day p.r.n. however the patient stated today that he "could not do this"  he and I discussed at length that we would like to refer him to [facility] I will provide him with the phone number today.   Dr. Cordova feels as though his depression may be getting worse and that this program could help with that.  He did inform me today that he is also seeing an external psychiatrist.  See plan agreed upon below    Treatment Plan:   - PT/OT/HEP: Discussed benefits of exercise for pain and withdrawal symptoms. He does find it helps, but cannot do this at night.    - He is planning on starting PT soon.   - Procedures: None at this time. Consider caudal ROSMERY in future, but main goal is getting off opioids at this point and pain is less of an issue.   - Medications:    - Cont  and refill Norco 5/325 mg q 12 hrs prn. #60. He takes 1/2 tablet 3 times per day, and we will start trying to take 1/2 tablet twice a day as needed, but will fill the full #60 in case he needs it. Will continue taper slowly when he is ready. Patient didn't taper to 1/2 tab BID PRN as discussed last CV with Dr. Cordova we will start that now. Last dose was of Norco 5-325 was last night.     -  reviewed.    - D/c'd baclofen 10-20 mg qHS prn patient reported "it makes him not want to get up"    - Pain contract signed 09/14/2021    - Cont gabapentin 600 mg TID. Titration provided.   - Imaging: Reviewed. Consider updating lumbar MRI prior to " any procedures if needed.   - Labs: Patient   - Referral given to [facility] ([phone])    Follow Up: RTC in 1 months virtual       TOYA Pires  Interventional Pain Management    Disclaimer: This note was partly generated using dictation software which may occasionally result in transcription errors.

## 2022-09-22 DIAGNOSIS — M54.16 LUMBAR RADICULOPATHY: ICD-10-CM

## 2022-09-22 DIAGNOSIS — F11.90 CHRONIC, CONTINUOUS USE OF OPIOIDS: ICD-10-CM

## 2022-09-22 DIAGNOSIS — M96.1 FAILED BACK SURGICAL SYNDROME: ICD-10-CM

## 2022-09-22 RX ORDER — HYDROCODONE BITARTRATE AND ACETAMINOPHEN 5; 325 MG/1; MG/1
1 TABLET ORAL EVERY 12 HOURS PRN
Qty: 60 TABLET | Refills: 0 | Status: SHIPPED | OUTPATIENT
Start: 2022-09-24 | End: 2022-10-24

## 2022-09-22 NOTE — TELEPHONE ENCOUNTER
----- Message from KAITLYNN Mc sent at 9/22/2022 10:05 AM CDT -----  Regarding: medication refill   Please pend-Norco 5/325 mg q 12 hrs prn. #60  Dr. Cordova  due on 09/24/2022 patient to return to see me virtually in 1 month      ty

## 2023-06-06 NOTE — TELEPHONE ENCOUNTER
The [place] Board of [employer]acy website for prescription monitoring was reviewed for the patient today, and it does not suggest any deviations in conflict with the patient's controlled substance contract with our clinic. Will continue current therapy with frequent monitoring of the controlled substance database, and urine drug screens on followup. Refill approved.     No

## 2023-12-03 ENCOUNTER — NURSE TRIAGE (OUTPATIENT)
Dept: NURSING | Facility: CLINIC | Age: 68
End: 2023-12-03

## 2023-12-03 ENCOUNTER — OFFICE VISIT (OUTPATIENT)
Dept: FAMILY MEDICINE | Facility: CLINIC | Age: 68
End: 2023-12-03
Payer: MEDICARE

## 2023-12-03 VITALS
TEMPERATURE: 98.5 F | SYSTOLIC BLOOD PRESSURE: 122 MMHG | WEIGHT: 252 LBS | RESPIRATION RATE: 16 BRPM | OXYGEN SATURATION: 96 % | DIASTOLIC BLOOD PRESSURE: 82 MMHG | HEART RATE: 88 BPM

## 2023-12-03 DIAGNOSIS — I10 ESSENTIAL HYPERTENSION: ICD-10-CM

## 2023-12-03 DIAGNOSIS — U07.1 INFECTION DUE TO 2019 NOVEL CORONAVIRUS: Primary | ICD-10-CM

## 2023-12-03 DIAGNOSIS — E78.5 HYPERLIPIDEMIA, UNSPECIFIED HYPERLIPIDEMIA TYPE: ICD-10-CM

## 2023-12-03 DIAGNOSIS — I50.9 CONGESTIVE HEART FAILURE, UNSPECIFIED HF CHRONICITY, UNSPECIFIED HEART FAILURE TYPE (H): ICD-10-CM

## 2023-12-03 DIAGNOSIS — G47.33 OBSTRUCTIVE SLEEP APNEA SYNDROME: ICD-10-CM

## 2023-12-03 DIAGNOSIS — F33.9 RECURRENT MAJOR DEPRESSIVE DISORDER, REMISSION STATUS UNSPECIFIED (H): ICD-10-CM

## 2023-12-03 PROCEDURE — 99203 OFFICE O/P NEW LOW 30 MIN: CPT | Performed by: FAMILY MEDICINE

## 2023-12-03 RX ORDER — AMLODIPINE BESYLATE 5 MG/1
5 TABLET ORAL DAILY
COMMUNITY
Start: 2023-09-05

## 2023-12-03 RX ORDER — ROSUVASTATIN CALCIUM 40 MG/1
40 TABLET, COATED ORAL DAILY
COMMUNITY
Start: 2023-09-05

## 2023-12-03 RX ORDER — ASPIRIN 81 MG/1
1 TABLET ORAL DAILY
COMMUNITY
Start: 2023-09-05

## 2023-12-03 RX ORDER — CLONAZEPAM 1 MG/1
1 TABLET ORAL
COMMUNITY
Start: 2023-05-18

## 2023-12-03 RX ORDER — EZETIMIBE 10 MG/1
10 TABLET ORAL
COMMUNITY
Start: 2023-09-05

## 2023-12-03 RX ORDER — ROPINIROLE 0.25 MG/1
0.25 TABLET, FILM COATED ORAL
COMMUNITY
Start: 2023-07-20

## 2023-12-03 RX ORDER — LAMOTRIGINE 200 MG/1
1 TABLET ORAL DAILY
COMMUNITY
Start: 2023-10-02 | End: 2024-10-01

## 2023-12-03 RX ORDER — FINASTERIDE 5 MG/1
5 TABLET, FILM COATED ORAL DAILY
COMMUNITY
Start: 2023-07-20

## 2023-12-03 RX ORDER — VALSARTAN 320 MG/1
1 TABLET ORAL DAILY
COMMUNITY
Start: 2023-03-15 | End: 2024-09-04

## 2023-12-03 RX ORDER — LEVOTHYROXINE SODIUM 50 UG/1
50 TABLET ORAL
COMMUNITY
Start: 2022-11-03

## 2023-12-03 RX ORDER — VITAMIN B COMPLEX
2000 TABLET ORAL
COMMUNITY

## 2023-12-03 RX ORDER — DEXLANSOPRAZOLE 30 MG/1
1 CAPSULE, DELAYED RELEASE ORAL DAILY
COMMUNITY
Start: 2023-07-02

## 2023-12-03 RX ORDER — CARVEDILOL 25 MG/1
25 TABLET ORAL
COMMUNITY
Start: 2023-09-05 | End: 2024-09-04

## 2023-12-03 RX ORDER — NITROGLYCERIN 400 UG/1
1 SPRAY ORAL
COMMUNITY

## 2023-12-03 RX ORDER — VILAZODONE HYDROCHLORIDE 40 MG/1
40 TABLET ORAL
COMMUNITY
Start: 2022-11-03

## 2023-12-03 RX ORDER — OLANZAPINE 10 MG/1
10 TABLET ORAL
COMMUNITY
Start: 2023-06-22

## 2023-12-03 NOTE — PROGRESS NOTES
Assessment:       Infection due to 2019 novel coronavirus    Recurrent major depressive disorder, remission status unspecified (H24)    Obstructive sleep apnea syndrome    Hyperlipidemia, unspecified hyperlipidemia type    Essential hypertension    Congestive heart failure, unspecified HF chronicity, unspecified heart failure type (H)    Plan:     With COVID-19 infection on day 1 of symptoms.  He is a candidate for Paxlovid however would need to hold his rosuvastatin and Viibryd and is not willing to hold his Viibryd as he was recently just discharged from the hospital due to his major depressive disorder.  They will continue to monitor symptoms closely.  He is currently clinically stable.  He is not short of breath and his O2 sats are normal.  They will  a pulse oximeter and monitor this at home and go to the ED if developing increased shortness of breath, weakness, increased work of breathing, or other concerning symptoms.    35 minutes was spent in counseling regarding questions of patient's chronic medical conditions in light of current COVID-19 infection, chart review, patient history, physical exam, discussing plan of care, and documentation.    MEDICATIONS:   Orders Placed This Encounter   Medications    amLODIPine (NORVASC) 5 MG tablet     Sig: Take 5 mg by mouth daily    aspirin 81 MG EC tablet     Sig: Take 1 tablet by mouth daily    carvedilol (COREG) 25 MG tablet     Sig: Take 25 mg by mouth    Vitamin D3 (VITAMIN D-1000 MAX ST) 25 mcg (1000 units) tablet     Sig: Take 2,000 Units by mouth    clonazePAM (KLONOPIN) 1 MG tablet     Sig: Take 1 mg by mouth    dexlansoprazole (DEXILANT) 30 MG CPDR CR capsule     Sig: Take 1 capsule by mouth daily    ezetimibe (ZETIA) 10 MG tablet     Sig: Take 10 mg by mouth    finasteride (PROSCAR) 5 MG tablet     Sig: Take 5 mg by mouth daily    lamoTRIgine (LAMICTAL) 200 MG tablet     Sig: Take 1 tablet by mouth daily    levothyroxine (SYNTHROID/LEVOTHROID) 50 MCG  tablet     Sig: Take 50 mcg by mouth    OLANZapine (ZYPREXA) 10 MG tablet     Sig: Take 10 mg by mouth    nitroGLYcerin (NITROLINGUAL) 0.4 MG/SPRAY spray     Sig: Place 1 spray under the tongue    rOPINIRole (REQUIP) 0.25 MG tablet     Sig: Take 0.25 mg by mouth    rosuvastatin (CRESTOR) 40 MG tablet     Sig: Take 40 mg by mouth daily    valsartan (DIOVAN) 320 MG tablet     Sig: Take 1 tablet by mouth daily    vilazodone (VIIBRYD) 40 MG TABS tablet     Sig: Take 40 mg by mouth    nirmatrelvir and ritonavir (PAXLOVID) 300 mg/100 mg therapy pack     Sig: Take 3 tablets by mouth 2 times daily for 5 days     Dispense:  30 tablet     Refill:  0     Date of symptom onset: 12/2/2023; Risk criteria met: Yes; Weight >40 kg Yes; Renal fxn: normal;  Drug-Drug interactions reviewed & addressed: Yes           Subjective:       67 year old male with a history of obstructive sleep apnea, major depressive disorder with recent hospitalization at [facility] for mental health, hyperlipidemia, congestive heart failure visiting his daughter here from [place] presents with his daughter for evaluation COVID-19 infection diagnosed last evening.  He started having symptoms yesterday of some nasal congestion.  Due to his age and other chronic health conditions he is at high risk for complications and they want to discuss treatment.  We reviewed at length his medications and would need to hold his Viibryd as well as rosuvastatin.  He is unwilling to hold his vibrio due to his tenuous mental health status.  We discussed the risks and benefits of this at length.  He currently is clinically feeling okay.    There is no problem list on file for this patient.      No past medical history on file.    No past surgical history on file.    Current Outpatient Medications   Medication    amLODIPine (NORVASC) 5 MG tablet    aspirin 81 MG EC tablet    carvedilol (COREG) 25 MG tablet    clonazePAM (KLONOPIN) 1 MG tablet    dexlansoprazole  (DEXILANT) 30 MG CPDR CR capsule    ezetimibe (ZETIA) 10 MG tablet    finasteride (PROSCAR) 5 MG tablet    lamoTRIgine (LAMICTAL) 200 MG tablet    levothyroxine (SYNTHROID/LEVOTHROID) 50 MCG tablet    nitroGLYcerin (NITROLINGUAL) 0.4 MG/SPRAY spray    OLANZapine (ZYPREXA) 10 MG tablet    rOPINIRole (REQUIP) 0.25 MG tablet    rosuvastatin (CRESTOR) 40 MG tablet    valsartan (DIOVAN) 320 MG tablet    vilazodone (VIIBRYD) 40 MG TABS tablet    Vitamin D3 (VITAMIN D-1000 MAX ST) 25 mcg (1000 units) tablet    nirmatrelvir and ritonavir (PAXLOVID) 300 mg/100 mg therapy pack     No current facility-administered medications for this visit.       Allergies   Allergen Reactions    Ace Inhibitors Other (See Comments)     cough    cough   cough       No family history on file.    Social History     Socioeconomic History    Marital status:      Spouse name: None    Number of children: None    Years of education: None    Highest education level: None   Tobacco Use    Smoking status: Never         Review of Systems  Pertinent items are noted in HPI.      Objective:               General Appearance:    /82   Pulse 88   Temp 98.5  F (36.9  C) (Tympanic)   Resp 16   Wt 114.3 kg (252 lb)   SpO2 96%         Alert, pleasant, cooperative, no distress, appears stated age, affect is flat   Head:    Normocephalic, without obvious abnormality, atraumatic   Eyes:    Conjunctiva/corneas clear   Ears:    Normal TM's without erythema or bulging. Normal external ear canals, both ears   Nose:   Nares normal, septum midline, mucosa normal, no drainage    or sinus tenderness   Throat:   Lips, mucosa, and tongue normal; teeth and gums normal.  No tonsilar hypertrophy or exudate.   Neck:   Supple, symmetrical, trachea midline, no adenopathy    Lungs:     Clear to auscultation bilaterally without wheezes, rales, or rhonchi, respirations unlabored    Heart:    Regular rate and rhythm, S1 and S2 normal, no murmur, rub or gallop        Extremities:   Extremities normal, atraumatic, no cyanosis or edema   Skin:   Skin color, texture, turgor normal, no rashes or lesions         This note has been dictated using voice recognition software. Any grammatical or context distortions are unintentional and inherent to the software

## 2023-12-03 NOTE — PATIENT INSTRUCTIONS
Your rosuvastatin until 2 days following discontinuing the Paxlovid.    Monitor pulse oximeter periodically through the day or as needed based on any shortness of breath.  Go to the emergency department with persistent O2 saturation at 90 or below.

## 2023-12-03 NOTE — TELEPHONE ENCOUNTER
Pt has tested positive for COVID 12/02/2023 via a home test     Pt is having a sore throat, coughing, body aches, fatigue - symptoms started yesterday 12/03/2023    No fever     Pt is having some shortness of breath when taking the stairs - more then normal     Pt was recently hospitalized at [facility] for hypoxia, confusion and newly discovered sleep apnea  - family is uncertain if pt was tested for COVID or Influenza while at the hospital. Unsure if her had a urine specimen taken and tested for a UTI     Per disposition: See HCP Within 4 Hours (Or PCP Triage)     Pt will be going to [facility] now for evaluation     Care advice given per protocol and when to call back. Pt verbalized understanding and agrees to plan of care.    Yesy Camacho RN  [place] Nurse Advisor  1:25 PM 12/3/2023                Reason for Disposition   MILD difficulty breathing (e.g., minimal/no SOB at rest, SOB with walking, pulse <100)    Additional Information   Negative: SEVERE difficulty breathing (e.g., struggling for each breath, speaks in single words)   Negative: Difficult to awaken or acting confused (e.g., disoriented, slurred speech)   Negative: Bluish (or gray) lips or face now   Negative: Shock suspected (e.g., cold/pale/clammy skin, too weak to stand, low BP, rapid pulse)   Negative: Sounds like a life-threatening emergency to the triager   Negative: [1] Diagnosed or suspected COVID-19 AND [2] symptoms lasting 3 or more weeks   Negative: [1] COVID-19 exposure AND [2] no symptoms   Negative: COVID-19 vaccine reaction suspected (e.g., fever, headache, muscle aches) occurring 1 to 3 days after getting vaccine   Negative: COVID-19 vaccine, questions about   Negative: [1] Lives with someone known to have influenza (flu test positive) AND [2] flu-like symptoms (e.g., cough, runny nose, sore throat, SOB; with or without fever)   Negative: [1] Possible COVID-19 symptoms AND [2] triager concerned about severity of symptoms or other causes    Negative: COVID-19 and breastfeeding, questions about   Negative: SEVERE or constant chest pain or pressure  (Exception: Mild central chest pain, present only when coughing.)   Negative: MODERATE difficulty breathing (e.g., speaks in phrases, SOB even at rest, pulse 100-120)   Negative: [1] Headache AND [2] stiff neck (can't touch chin to chest)   Negative: Oxygen level (e.g., pulse oximetry) 90 percent or lower   Negative: Chest pain or pressure  (Exception: MILD central chest pain, present only when coughing.)   Negative: [1] Drinking very little AND [2] dehydration suspected (e.g., no urine > 12 hours, very dry mouth, very lightheaded)   Negative: Patient sounds very sick or weak to the triager    Protocols used: Coronavirus (COVID-19) Diagnosed or [id number]